# Patient Record
Sex: FEMALE | Race: WHITE | HISPANIC OR LATINO | Employment: UNEMPLOYED | RURAL
[De-identification: names, ages, dates, MRNs, and addresses within clinical notes are randomized per-mention and may not be internally consistent; named-entity substitution may affect disease eponyms.]

---
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
## 2020-10-23 ENCOUNTER — OFFICE VISIT (OUTPATIENT)
Dept: FAMILY MEDICINE CLINIC | Facility: CLINIC | Age: 20
End: 2020-10-23

## 2020-10-23 VITALS
BODY MASS INDEX: 25.95 KG/M2 | SYSTOLIC BLOOD PRESSURE: 118 MMHG | WEIGHT: 141 LBS | HEIGHT: 62 IN | OXYGEN SATURATION: 98 % | DIASTOLIC BLOOD PRESSURE: 68 MMHG | TEMPERATURE: 98.2 F | RESPIRATION RATE: 16 BRPM | HEART RATE: 105 BPM

## 2020-10-23 DIAGNOSIS — Z00.00 PHYSICAL EXAM: Primary | ICD-10-CM

## 2020-10-23 PROCEDURE — PEPHY: Performed by: FAMILY MEDICINE

## 2020-10-23 RX ORDER — PRENATAL VIT/IRON FUM/FOLIC AC 27MG-0.8MG
1 TABLET ORAL DAILY
COMMUNITY
End: 2022-06-24

## 2020-10-23 NOTE — PROGRESS NOTES
Subjective   Hina Tucker is a 20 y.o. female.     Chief Complaint   Patient presents with   • Pre-employment       Pre-Employment Exam:  Overall has: moderate activities, good energy level, good appetite, sleeping well at night. Nutrition: inappropriate diet. The patient has no complaints at this time. Physical limitations: none.         The following portions of the patient's history were reviewed and updated as appropriate: allergies, current medications, past family history, past medical history, past social history, past surgical history and problem list.    Allergies:  Allergies   Allergen Reactions   • Amoxicillin Anaphylaxis   • Penicillin G Anaphylaxis       Social History:  Social History     Socioeconomic History   • Marital status: Single     Spouse name: Not on file   • Number of children: Not on file   • Years of education: Not on file   • Highest education level: Not on file   Tobacco Use   • Smoking status: Current Every Day Smoker     Packs/day: 0.50     Years: 4.00     Pack years: 2.00     Types: Cigarettes     Start date: 2016   • Smokeless tobacco: Never Used   Substance and Sexual Activity   • Alcohol use: No     Frequency: Never   • Drug use: No   • Sexual activity: Defer       Family History:  Family History   Problem Relation Age of Onset   • COPD Maternal Grandmother    • Stroke Maternal Grandfather        Past Medical History :  Patient Active Problem List   Diagnosis   • Anxiety and depression   • Attention deficit hyperactivity disorder (ADHD), predominantly inattentive type       Medication List:  Outpatient Encounter Medications as of 10/23/2020   Medication Sig Dispense Refill   • Prenatal Vit-Fe Fumarate-FA (prenatal vitamin 27-0.8) 27-0.8 MG tablet tablet Take 1 tablet by mouth Daily.     • [DISCONTINUED] GAVIOTA 0.25-35 MG-MCG per tablet Take 1 tablet by mouth Daily.  1     No facility-administered encounter medications on file as of 10/23/2020.        Past Surgical  "History:  History reviewed. No pertinent surgical history.    Review of Systems:  Review of Systems   Constitutional: Negative for appetite change, fatigue and fever.   HENT: Negative for congestion, ear pain, sinus pressure, sore throat and tinnitus.    Eyes: Negative for visual disturbance.   Respiratory: Negative for cough, shortness of breath and wheezing.    Cardiovascular: Negative for chest pain, palpitations and leg swelling.   Gastrointestinal: Negative for abdominal pain, constipation, diarrhea, nausea and vomiting.   Endocrine: Negative.  Negative for cold intolerance, heat intolerance, polydipsia and polyuria.   Genitourinary: Negative for dysuria, frequency and hematuria.        She is 24 weeks  pregnant   Musculoskeletal: Negative for arthralgias, joint swelling and myalgias.   Skin: Negative for rash and bruise.   Allergic/Immunologic: Negative for environmental allergies.   Neurological: Negative for dizziness, syncope, weakness and headache.   Hematological: Negative for adenopathy. Does not bruise/bleed easily.   Psychiatric/Behavioral: Negative for depressed mood.       I have reviewed and confirmed the accuracy of the HPI and ROS as documented by the MA/LPN/RN Rachell Tam MD    Vital Signs:  Visit Vitals  /68 (BP Location: Right arm, Patient Position: Sitting, Cuff Size: Adult)   Pulse 105   Temp 98.2 °F (36.8 °C) (Oral)   Resp 16   Ht 157.5 cm (62\")   Wt 64 kg (141 lb)   LMP 04/26/2020   SpO2 98% Comment: Room air   BMI 25.79 kg/m²       Physical Exam  Vitals signs reviewed.   Constitutional:       General: She is not in acute distress.     Appearance: She is well-developed.   HENT:      Right Ear: Tympanic membrane and external ear normal.      Left Ear: Tympanic membrane and external ear normal.   Eyes:      Conjunctiva/sclera: Conjunctivae normal.   Neck:      Musculoskeletal: Neck supple.      Thyroid: No thyromegaly.      Vascular: No JVD.   Cardiovascular:      Rate and Rhythm: " Normal rate and regular rhythm.      Heart sounds: Normal heart sounds. No murmur. No friction rub. No gallop.    Pulmonary:      Effort: Pulmonary effort is normal. No respiratory distress.      Breath sounds: Normal breath sounds. No wheezing or rales.   Abdominal:      General: Bowel sounds are normal. There is no distension.      Palpations: Abdomen is soft.      Tenderness: There is no abdominal tenderness.      Comments: Gravid 24 weeks   Lymphadenopathy:      Cervical: No cervical adenopathy.   Skin:     General: Skin is warm.      Findings: No erythema or rash.   Neurological:      Mental Status: She is alert and oriented to person, place, and time.      Coordination: Coordination normal.      Deep Tendon Reflexes: Reflexes normal.         Assessment and Plan:  Problem List Items Addressed This Visit     None      Visit Diagnoses     Physical exam    -  Primary    Pre employment. 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           An After Visit Summary and PPPS were given to the patient.       I wore protective equipment throughout this patient encounter to include mask and eye protection. Hand hygiene was performed before donning protective equipment and after removal when leaving the room.

## 2021-10-12 ENCOUNTER — OFFICE VISIT (OUTPATIENT)
Dept: FAMILY MEDICINE CLINIC | Facility: CLINIC | Age: 21
End: 2021-10-12

## 2021-10-12 VITALS
SYSTOLIC BLOOD PRESSURE: 104 MMHG | WEIGHT: 131 LBS | OXYGEN SATURATION: 99 % | HEART RATE: 112 BPM | DIASTOLIC BLOOD PRESSURE: 62 MMHG | HEIGHT: 62 IN | BODY MASS INDEX: 24.11 KG/M2 | TEMPERATURE: 97.8 F

## 2021-10-12 DIAGNOSIS — Z11.59 ENCOUNTER FOR HEPATITIS C SCREENING TEST FOR LOW RISK PATIENT: ICD-10-CM

## 2021-10-12 DIAGNOSIS — Z13.29 SCREENING FOR THYROID DISORDER: ICD-10-CM

## 2021-10-12 DIAGNOSIS — Z86.59 HISTORY OF ADHD: Primary | ICD-10-CM

## 2021-10-12 DIAGNOSIS — Z30.013 ENCOUNTER FOR INITIAL PRESCRIPTION OF INJECTABLE CONTRACEPTIVE: ICD-10-CM

## 2021-10-12 DIAGNOSIS — F41.9 ANXIETY: ICD-10-CM

## 2021-10-12 DIAGNOSIS — Z79.899 MEDICATION MANAGEMENT: ICD-10-CM

## 2021-10-12 DIAGNOSIS — Z13.220 SCREENING FOR LIPID DISORDERS: ICD-10-CM

## 2021-10-12 LAB
ALBUMIN SERPL-MCNC: 4.9 G/DL (ref 3.5–5.2)
ALBUMIN/GLOB SERPL: 2 G/DL
ALP SERPL-CCNC: 48 U/L (ref 39–117)
ALT SERPL W P-5'-P-CCNC: 11 U/L (ref 1–33)
ANION GAP SERPL CALCULATED.3IONS-SCNC: 8.6 MMOL/L (ref 5–15)
AST SERPL-CCNC: 16 U/L (ref 1–32)
B-HCG UR QL: NEGATIVE
BASOPHILS # BLD AUTO: 0.03 10*3/MM3 (ref 0–0.2)
BASOPHILS NFR BLD AUTO: 1.1 % (ref 0–1.5)
BILIRUB SERPL-MCNC: 0.4 MG/DL (ref 0–1.2)
BUN SERPL-MCNC: 9 MG/DL (ref 6–20)
BUN/CREAT SERPL: 14.8 (ref 7–25)
CALCIUM SPEC-SCNC: 9.6 MG/DL (ref 8.6–10.5)
CHLORIDE SERPL-SCNC: 105 MMOL/L (ref 98–107)
CHOLEST SERPL-MCNC: 137 MG/DL (ref 0–200)
CO2 SERPL-SCNC: 24.4 MMOL/L (ref 22–29)
CREAT SERPL-MCNC: 0.61 MG/DL (ref 0.57–1)
DEPRECATED RDW RBC AUTO: 45.2 FL (ref 37–54)
EOSINOPHIL # BLD AUTO: 0.06 10*3/MM3 (ref 0–0.4)
EOSINOPHIL NFR BLD AUTO: 2.3 % (ref 0.3–6.2)
ERYTHROCYTE [DISTWIDTH] IN BLOOD BY AUTOMATED COUNT: 15 % (ref 12.3–15.4)
EXPIRATION DATE: NORMAL
GFR SERPL CREATININE-BSD FRML MDRD: 124 ML/MIN/1.73
GLOBULIN UR ELPH-MCNC: 2.5 GM/DL
GLUCOSE SERPL-MCNC: 77 MG/DL (ref 65–99)
HCT VFR BLD AUTO: 34.4 % (ref 34–46.6)
HCV AB SER DONR QL: NORMAL
HDLC SERPL-MCNC: 44 MG/DL (ref 40–60)
HGB BLD-MCNC: 11 G/DL (ref 12–15.9)
IMM GRANULOCYTES # BLD AUTO: 0 10*3/MM3 (ref 0–0.05)
IMM GRANULOCYTES NFR BLD AUTO: 0 % (ref 0–0.5)
INTERNAL NEGATIVE CONTROL: NORMAL
INTERNAL POSITIVE CONTROL: NORMAL
LDLC SERPL CALC-MCNC: 81 MG/DL (ref 0–100)
LDLC/HDLC SERPL: 1.87 {RATIO}
LYMPHOCYTES # BLD AUTO: 0.91 10*3/MM3 (ref 0.7–3.1)
LYMPHOCYTES NFR BLD AUTO: 34.3 % (ref 19.6–45.3)
Lab: NORMAL
MCH RBC QN AUTO: 26.3 PG (ref 26.6–33)
MCHC RBC AUTO-ENTMCNC: 32 G/DL (ref 31.5–35.7)
MCV RBC AUTO: 82.1 FL (ref 79–97)
MONOCYTES # BLD AUTO: 0.24 10*3/MM3 (ref 0.1–0.9)
MONOCYTES NFR BLD AUTO: 9.1 % (ref 5–12)
NEUTROPHILS NFR BLD AUTO: 1.41 10*3/MM3 (ref 1.7–7)
NEUTROPHILS NFR BLD AUTO: 53.2 % (ref 42.7–76)
NRBC BLD AUTO-RTO: 0 /100 WBC (ref 0–0.2)
PLATELET # BLD AUTO: 267 10*3/MM3 (ref 140–450)
PMV BLD AUTO: 10.9 FL (ref 6–12)
POTASSIUM SERPL-SCNC: 3.7 MMOL/L (ref 3.5–5.2)
PROT SERPL-MCNC: 7.4 G/DL (ref 6–8.5)
RBC # BLD AUTO: 4.19 10*6/MM3 (ref 3.77–5.28)
SODIUM SERPL-SCNC: 138 MMOL/L (ref 136–145)
TRIGL SERPL-MCNC: 54 MG/DL (ref 0–150)
TSH SERPL DL<=0.05 MIU/L-ACNC: 0.77 UIU/ML (ref 0.27–4.2)
VLDLC SERPL-MCNC: 12 MG/DL (ref 5–40)
WBC # BLD AUTO: 2.65 10*3/MM3 (ref 3.4–10.8)

## 2021-10-12 PROCEDURE — 86803 HEPATITIS C AB TEST: CPT | Performed by: FAMILY MEDICINE

## 2021-10-12 PROCEDURE — 80061 LIPID PANEL: CPT | Performed by: FAMILY MEDICINE

## 2021-10-12 PROCEDURE — 80050 GENERAL HEALTH PANEL: CPT | Performed by: FAMILY MEDICINE

## 2021-10-12 PROCEDURE — 81025 URINE PREGNANCY TEST: CPT | Performed by: FAMILY MEDICINE

## 2021-10-12 PROCEDURE — 99214 OFFICE O/P EST MOD 30 MIN: CPT | Performed by: FAMILY MEDICINE

## 2021-10-12 RX ORDER — ESCITALOPRAM OXALATE 10 MG/1
10 TABLET ORAL EVERY MORNING
Qty: 30 TABLET | Refills: 0 | Status: SHIPPED | OUTPATIENT
Start: 2021-10-12 | End: 2021-11-05 | Stop reason: SDUPTHER

## 2021-10-12 RX ORDER — MEDROXYPROGESTERONE ACETATE 150 MG/ML
150 INJECTION, SUSPENSION INTRAMUSCULAR
Qty: 1 ML | Refills: 1 | Status: SHIPPED | OUTPATIENT
Start: 2021-10-12 | End: 2021-12-29 | Stop reason: SDUPTHER

## 2021-10-12 RX ORDER — HYDROXYZINE HYDROCHLORIDE 25 MG/1
25 TABLET, FILM COATED ORAL 3 TIMES DAILY PRN
Qty: 45 TABLET | Refills: 0 | Status: SHIPPED | OUTPATIENT
Start: 2021-10-12 | End: 2021-10-22

## 2021-10-12 NOTE — PROGRESS NOTES
"Chief Complaint    ADHD (history or and back in school)    Subjective      Hina Tucker presents to [facility]     History of Present Illness    1.) HISTORY OF ADHD : Patient reports a history of ADHD where she was once treated during her teenage years. She does not remember the name of the medication she was prescribed. She admits that she has not been taking any medication for her symptoms for several years. She presents requesting restarting her medication secondary to starting school, being a new mom, etc.    2.) CONTRACEPTION MANAGEMENT : Patient is requesting starting the Depo-Provera. No history of blood clots. She admits to using vapes for both nicotine and THC.    3.) MOOD COMPLAINT : Patient reports complaints of anxiety. Reports frequent anger outbursts. Reports oscillation of her moods where she intermittently feels encouraged to complete tasks and then sometimes 'lays around the house all day.' She reports being treated for mood disorder in the past, but does not remember the name of the medications. She denies depression. She reports that she does not like being around people and she is not interested in 'having friends.'     Objective      Vital Signs:     /62   Pulse 112   Temp 97.8 °F (36.6 °C)   Ht 157.5 cm (62\")   Wt 59.4 kg (131 lb)   SpO2 99%   BMI 23.96 kg/m²       Physical Exam  Vitals reviewed.   Constitutional:       General: She is not in acute distress.     Appearance: Normal appearance. She is well-developed.   HENT:      Head: Normocephalic and atraumatic.      Right Ear: Hearing and external ear normal.      Left Ear: Hearing and external ear normal.      Nose: Nose normal.   Eyes:      General: Lids are normal.         Right eye: No discharge.         Left eye: No discharge.      Conjunctiva/sclera: Conjunctivae normal.   Pulmonary:      Effort: Pulmonary effort is normal.   Abdominal:      General: There is no distension. "   Musculoskeletal:         General: No swelling.      Cervical back: Neck supple.   Skin:     Coloration: Skin is not jaundiced.      Findings: No erythema.   Neurological:      Mental Status: She is alert. Mental status is at baseline.   Psychiatric:         Mood and Affect: Mood and affect normal.         Thought Content: Thought content normal.     Assessment and Plan    Diagnoses and all orders for this visit:    1. History of ADHD (Primary)  -     TSH  -     Ambulatory Referral to Psychiatry    2. Encounter for initial prescription of injectable contraceptive  -     POCT pregnancy, urine    3. Anxiety    4. Screening for thyroid disorder    5. Encounter for hepatitis C screening test for low risk patient  -     Hepatitis C antibody    6. Medication management  -     CBC w AUTO Differential  -     Comprehensive metabolic panel    7. Screening for lipid disorders  -     Lipid panel    Other orders  -     escitalopram (Lexapro) 10 MG tablet; Take 1 tablet by mouth Every Morning.  Dispense: 30 tablet; Refill: 0  -     hydrOXYzine (ATARAX) 25 MG tablet; Take 1 tablet by mouth 3 (Three) Times a Day As Needed for Anxiety.  Dispense: 45 tablet; Refill: 0  -     medroxyPROGESTERone (Depo-Provera) 150 MG/ML injection; Inject 1 mL into the appropriate muscle as directed by prescriber Every 3 (Three) Months.  Dispense: 1 mL; Refill: 1  -     calcium-vitamin D 250-100 MG-UNIT per tablet; Take 1 tablet by mouth 2 (Two) Times a Day.  Dispense: 180 tablet; Refill: 3      Follow Up     Return in about 1 week (around 10/19/2021) for lab review/follow up new medications.     Patient was given instructions and counseling regarding her condition or for health maintenance advice. Please see specific information pulled into the AVS if appropriate.

## 2021-10-12 NOTE — PROGRESS NOTES
Venipuncture Blood Specimen Collection  Venipuncture performed in left arm  by Jen Escobedo with good hemostasis. Patient tolerated the procedure well without complications.   10/12/21   Jen Escobedo

## 2021-10-13 ENCOUNTER — CLINICAL SUPPORT (OUTPATIENT)
Dept: FAMILY MEDICINE CLINIC | Facility: CLINIC | Age: 21
End: 2021-10-13

## 2021-10-13 DIAGNOSIS — Z78.9 USES BIRTH CONTROL: Primary | ICD-10-CM

## 2021-10-13 PROCEDURE — 96372 THER/PROPH/DIAG INJ SC/IM: CPT | Performed by: FAMILY MEDICINE

## 2021-10-13 RX ORDER — MEDROXYPROGESTERONE ACETATE 150 MG/ML
150 INJECTION, SUSPENSION INTRAMUSCULAR ONCE
Status: COMPLETED | OUTPATIENT
Start: 2021-10-13 | End: 2021-10-13

## 2021-10-13 RX ADMIN — MEDROXYPROGESTERONE ACETATE 150 MG: 150 INJECTION, SUSPENSION INTRAMUSCULAR at 09:55

## 2021-10-22 ENCOUNTER — OFFICE VISIT (OUTPATIENT)
Dept: FAMILY MEDICINE CLINIC | Facility: CLINIC | Age: 21
End: 2021-10-22

## 2021-10-22 VITALS
OXYGEN SATURATION: 100 % | BODY MASS INDEX: 23.74 KG/M2 | HEART RATE: 74 BPM | HEIGHT: 62 IN | SYSTOLIC BLOOD PRESSURE: 118 MMHG | WEIGHT: 129 LBS | TEMPERATURE: 97.7 F | DIASTOLIC BLOOD PRESSURE: 62 MMHG

## 2021-10-22 DIAGNOSIS — F41.9 ANXIETY AND DEPRESSION: ICD-10-CM

## 2021-10-22 DIAGNOSIS — F90.0 ATTENTION DEFICIT HYPERACTIVITY DISORDER (ADHD), PREDOMINANTLY INATTENTIVE TYPE: Primary | ICD-10-CM

## 2021-10-22 DIAGNOSIS — F32.A ANXIETY AND DEPRESSION: ICD-10-CM

## 2021-10-22 PROCEDURE — 99214 OFFICE O/P EST MOD 30 MIN: CPT | Performed by: FAMILY MEDICINE

## 2021-10-22 RX ORDER — DEXTROAMPHETAMINE SACCHARATE, AMPHETAMINE ASPARTATE MONOHYDRATE, DEXTROAMPHETAMINE SULFATE AND AMPHETAMINE SULFATE 5; 5; 5; 5 MG/1; MG/1; MG/1; MG/1
20 CAPSULE, EXTENDED RELEASE ORAL EVERY MORNING
Qty: 30 CAPSULE | Refills: 0 | Status: SHIPPED | OUTPATIENT
Start: 2021-10-22 | End: 2021-11-22 | Stop reason: SDUPTHER

## 2021-10-22 RX ORDER — BUSPIRONE HYDROCHLORIDE 5 MG/1
5 TABLET ORAL 2 TIMES DAILY
Qty: 60 TABLET | Refills: 0 | Status: SHIPPED | OUTPATIENT
Start: 2021-10-22 | End: 2021-11-22

## 2021-10-22 NOTE — PROGRESS NOTES
"Chief Complaint    Follow-up (F/U on her medication )    Subjective      Hina Tucker presents to [facility]     History of Present Illness    1.) ANXIETY AND DEPRESSION : The patient presents for follow-up after her recent visit, where she complained of anxiety depression. During that visit the patient was placed on Lexapro, as well as hydroxyzine. She reports that she has noticed a difference in her mood after starting Lexapro. She reports discontinuing Atarax secondary to sedation. Patient reports that she would like to try different medication for anxiety.    2.) ADHD : History of ADHD. Patient reported during her most recent visit that she had been off her medication for some time. Recent pregnancy. She reported an interest in restarting her medication secondary to starting school and other stressors at home including being a new mother. She admits to contacting [facility], where she was advised that they do not diagnose ADHD. We are still awaiting her records regarding her diagnosis. The patient reports that she continues to experience significant inattentiveness.    Objective      Vital Signs:     /62 (BP Location: Left arm, Patient Position: Sitting, Cuff Size: Adult)   Pulse 74   Temp 97.7 °F (36.5 °C) (Temporal)   Ht 157.5 cm (62\")   Wt 58.5 kg (129 lb)   SpO2 100%   BMI 23.59 kg/m²       Physical Exam  Vitals reviewed.   Constitutional:       General: She is not in acute distress.     Appearance: Normal appearance. She is well-developed.   HENT:      Head: Normocephalic and atraumatic.      Right Ear: Hearing and external ear normal.      Left Ear: Hearing and external ear normal.      Nose: Nose normal.   Eyes:      General: Lids are normal.         Right eye: No discharge.         Left eye: No discharge.      Conjunctiva/sclera: Conjunctivae normal.   Pulmonary:      Effort: Pulmonary effort is normal.   Abdominal:      General: There is no distension. "   Musculoskeletal:         General: No swelling.      Cervical back: Neck supple.   Skin:     Coloration: Skin is not jaundiced.      Findings: No erythema.   Neurological:      Mental Status: She is alert. Mental status is at baseline.   Psychiatric:         Mood and Affect: Mood and affect normal.         Thought Content: Thought content normal.     Assessment and Plan    Diagnoses and all orders for this visit:    1. Attention deficit hyperactivity disorder (ADHD), predominantly inattentive type (Primary)  Comments:  1.)  Referred to [facility] for diagnosis. Shared decision to re-start Adderall x 30 day supply. Patient will attempt to obtain records from [place].  Orders:  -     amphetamine-dextroamphetamine XR (Adderall XR) 20 MG 24 hr capsule; Take 1 capsule by mouth Every Morning  Dispense: 30 capsule; Refill: 0    2. Anxiety and depression  Comments:  1.) Continue Lexapro at current dose. Atarax discontinued. Start BuSpar. Return in 4 weeks for re-evaluation.    Other orders  -     busPIRone (BUSPAR) 5 MG tablet; Take 1 tablet by mouth 2 (two) times a day.  Dispense: 60 tablet; Refill: 0    *CONTROLLED SUBSTANCE AGREEMENT SIGNED.*    Follow Up     Return in about 4 weeks (around 11/19/2021) for anxiety and depression/ADHD.     Patient was given instructions and counseling regarding her condition or for health maintenance advice. Please see specific information pulled into the AVS if appropriate.

## 2021-11-05 ENCOUNTER — TELEPHONE (OUTPATIENT)
Dept: FAMILY MEDICINE CLINIC | Facility: CLINIC | Age: 21
End: 2021-11-05

## 2021-11-05 RX ORDER — ESCITALOPRAM OXALATE 10 MG/1
10 TABLET ORAL EVERY MORNING
Qty: 30 TABLET | Refills: 0 | Status: CANCELLED | OUTPATIENT
Start: 2021-11-05

## 2021-11-05 RX ORDER — ESCITALOPRAM OXALATE 10 MG/1
10 TABLET ORAL EVERY MORNING
Qty: 90 TABLET | Refills: 1 | Status: SHIPPED | OUTPATIENT
Start: 2021-11-05 | End: 2021-11-23 | Stop reason: SDUPTHER

## 2021-11-05 NOTE — TELEPHONE ENCOUNTER
Caller: Hina Tucker    Relationship: Self        Requested Prescriptions:   Requested Prescriptions     Pending Prescriptions Disp Refills   • escitalopram (Lexapro) 10 MG tablet 30 tablet 0     Sig: Take 1 tablet by mouth Every Morning.        Pharmacy where request should be sent: [facility]   [phone]     Additional details provided by patient:PATIENT HAS A WEEK LEFT. PLEASE CALL AND ADVISE.     Best call back number: [phone]     Does the patient have less than a 3 day supply:  [x] Yes  [] No    Lee Pate Rep   11/05/21 13:04 EDT

## 2021-11-09 ENCOUNTER — TELEPHONE (OUTPATIENT)
Dept: FAMILY MEDICINE CLINIC | Facility: CLINIC | Age: 21
End: 2021-11-09

## 2021-11-09 NOTE — TELEPHONE ENCOUNTER
Caller: Hina Tucker A    Relationship to patient: Self    Best call back number: [phone]    Patient is needing: PATIENT CALLED IN AND SAID SHE WOULD LIKE TO SPEAK WITH DR. BAL ABOUT HER ADHD MEDICATION. PLEASE CALL PATIENT AND ADVISE.

## 2021-11-17 ENCOUNTER — TELEPHONE (OUTPATIENT)
Dept: FAMILY MEDICINE CLINIC | Facility: CLINIC | Age: 21
End: 2021-11-17

## 2021-11-17 NOTE — TELEPHONE ENCOUNTER
Caller: Hina Tucker    Relationship: Self    Best call back number: [phone]    What was the call regarding: PATIENT CALLED WANTING TO KNOW IF HER MEDICAL RECORDS FROM [facility] IN [facility] WHERE RECEIVED AT THE OFFICE YET.    Do you require a callback: YES PLEASE CALL BACK AND ADVISE

## 2021-11-22 ENCOUNTER — OFFICE VISIT (OUTPATIENT)
Dept: FAMILY MEDICINE CLINIC | Facility: CLINIC | Age: 21
End: 2021-11-22

## 2021-11-22 VITALS
HEIGHT: 62 IN | SYSTOLIC BLOOD PRESSURE: 102 MMHG | OXYGEN SATURATION: 96 % | DIASTOLIC BLOOD PRESSURE: 64 MMHG | BODY MASS INDEX: 21.53 KG/M2 | TEMPERATURE: 98.2 F | WEIGHT: 117 LBS | HEART RATE: 76 BPM

## 2021-11-22 DIAGNOSIS — F90.0 ATTENTION DEFICIT HYPERACTIVITY DISORDER (ADHD), PREDOMINANTLY INATTENTIVE TYPE: ICD-10-CM

## 2021-11-22 DIAGNOSIS — N93.9 ABNORMAL UTERINE BLEEDING: Primary | ICD-10-CM

## 2021-11-22 PROCEDURE — 99213 OFFICE O/P EST LOW 20 MIN: CPT | Performed by: FAMILY MEDICINE

## 2021-11-22 RX ORDER — BUSPIRONE HYDROCHLORIDE 5 MG/1
TABLET ORAL
Qty: 60 TABLET | Refills: 2 | Status: SHIPPED | OUTPATIENT
Start: 2021-11-22 | End: 2022-02-23

## 2021-11-22 RX ORDER — DEXTROAMPHETAMINE SACCHARATE, AMPHETAMINE ASPARTATE MONOHYDRATE, DEXTROAMPHETAMINE SULFATE AND AMPHETAMINE SULFATE 5; 5; 5; 5 MG/1; MG/1; MG/1; MG/1
20 CAPSULE, EXTENDED RELEASE ORAL EVERY MORNING
Qty: 30 CAPSULE | Refills: 0 | Status: SHIPPED | OUTPATIENT
Start: 2021-11-22 | End: 2021-12-28 | Stop reason: SDUPTHER

## 2021-11-22 NOTE — PROGRESS NOTES
"Chief Complaint    ADD (refill) and Menstrual Problem (long periods )    Subjective      Hina Tucker presents to [facility]     History of Present Illness    1.) ADHD : Patient presents for medication refill. During her most recent visit here in office, she was started on Adderall. Advised to obtain her most recent medical records regarding her diagnosis. Records have been uploaded to chart. The patient reports significant improvement of her symptoms after starting the medication. She admits that she has experienced significant symptomatic improvement of her attention concerns. She would like to remain on her current dose that she is taking the medication once a day.    2.) ABNORMAL UTERINE BLEEDING : Depo-Provera-most recent injection 10/13/2021. Reports 3 weeks of menses-like symptoms. She reports improvement although symptoms. Denies any pelvic or abdominal pain.    Objective      Vital Signs:     /64   Pulse 76   Temp 98.2 °F (36.8 °C)   Ht 157.5 cm (62\")   Wt 53.1 kg (117 lb)   SpO2 96%   BMI 21.40 kg/m²       Physical Exam  Vitals reviewed.   Constitutional:       General: She is not in acute distress.     Appearance: Normal appearance. She is well-developed.   HENT:      Head: Normocephalic and atraumatic.      Right Ear: Hearing and external ear normal.      Left Ear: Hearing and external ear normal.      Nose: Nose normal.   Eyes:      General: Lids are normal.         Right eye: No discharge.         Left eye: No discharge.      Conjunctiva/sclera: Conjunctivae normal.   Pulmonary:      Effort: Pulmonary effort is normal.   Abdominal:      General: There is no distension.   Musculoskeletal:         General: No swelling.      Cervical back: Neck supple.   Skin:     Coloration: Skin is not jaundiced.      Findings: No erythema.   Neurological:      Mental Status: She is alert. Mental status is at baseline.   Psychiatric:         Mood and Affect: Mood and " affect normal.         Thought Content: Thought content normal.     Assessment and Plan     Diagnoses and all orders for this visit:    1. Abnormal uterine bleeding (Primary)  Comments:  1.) Watchful waiting, as patient reports improvement. Contact office if sxs do not continue to improve.    2. Attention deficit hyperactivity disorder (ADHD), predominantly inattentive type  Comments:  1.)  Records uploaded to chart. Will continue Adderall at current dose. Will complete UDS during next visit here in office.   Orders:  -     amphetamine-dextroamphetamine XR (Adderall XR) 20 MG 24 hr capsule; Take 1 capsule by mouth Every Morning  Dispense: 30 capsule; Refill: 0    Follow Up     Return in about 3 months (around 2/22/2022).     Patient was given instructions and counseling regarding her condition or for health maintenance advice. Please see specific information pulled into the AVS if appropriate.

## 2021-11-23 DIAGNOSIS — F90.0 ATTENTION DEFICIT HYPERACTIVITY DISORDER (ADHD), PREDOMINANTLY INATTENTIVE TYPE: ICD-10-CM

## 2021-11-23 RX ORDER — ESCITALOPRAM OXALATE 10 MG/1
10 TABLET ORAL EVERY MORNING
Qty: 90 TABLET | Refills: 1 | Status: SHIPPED | OUTPATIENT
Start: 2021-11-23 | End: 2022-01-26 | Stop reason: SDUPTHER

## 2021-11-23 NOTE — TELEPHONE ENCOUNTER
Caller: Hina Tucker    Relationship: Self    Best call back number: [phone]    Requested Prescriptions:   Requested Prescriptions     Pending Prescriptions Disp Refills   • escitalopram (Lexapro) 10 MG tablet 90 tablet 1     Sig: Take 1 tablet by mouth Every Morning.   • calcium-vitamin D 250-100 MG-UNIT per tablet 180 tablet 3     Sig: Take 1 tablet by mouth 2 (Two) Times a Day.        Pharmacy where request should be sent: [facility] [phone] [facility] [phone] [facility]     Additional details provided by patient: THE PATIENT IS COMPLETELY OUT OF HER LEXAPRO AND SHE IS ALSO OUT OF CALCIUM    Does the patient have less than a 3 day supply:  [x] Yes  [] No    Lee Caputo Rep   11/23/21 10:31 EST

## 2021-11-29 ENCOUNTER — TELEPHONE (OUTPATIENT)
Dept: FAMILY MEDICINE CLINIC | Facility: CLINIC | Age: 21
End: 2021-11-29

## 2021-11-29 NOTE — TELEPHONE ENCOUNTER
Caller: Hina Tucker A    Relationship to patient: Self    Best call back number: [phone]    Patient is needing: PATIENT CALLED STATING SHE CAME IN 11/22/2021 TO SEE HER PCP ABOUT HER BEING ON THE DEPO SHOT AND SHE STATED SHE HAS BEEN ON HER PERIOD FOR ABOUT 3 WEEKS. SHE STATED HER PCP TOLD HER TO GO HOME AND SEE HOW LONG IT LAST AND IF ITS STILL GOING ON AFTER ANOTHER WEEK THAT SHE NEEDS TO CALL BACK AND LET HER PCP KNOW ABOUT EITHER GETTING AN ULTRASOUND OR A PROGESTERONE SHOT. THE PATIENT WOULD LIKE A CALL BACK TO SPEAK WITH CLINICAL ABOUT THIS PLEASE ADVISE THANK YOU.

## 2021-12-01 RX ORDER — MEDROXYPROGESTERONE ACETATE 10 MG/1
10 TABLET ORAL DAILY
Qty: 10 TABLET | Refills: 0 | Status: SHIPPED | OUTPATIENT
Start: 2021-12-01 | End: 2021-12-11

## 2021-12-01 NOTE — TELEPHONE ENCOUNTER
Sent her a progestin to take for 10 days to see if that will help. She can call to let us know if she is still bleeding after completing the course. Thank you!

## 2021-12-13 RX ORDER — MEDROXYPROGESTERONE ACETATE 10 MG/1
TABLET ORAL
Qty: 10 TABLET | Refills: 0 | OUTPATIENT
Start: 2021-12-13

## 2021-12-28 ENCOUNTER — TELEPHONE (OUTPATIENT)
Dept: FAMILY MEDICINE CLINIC | Facility: CLINIC | Age: 21
End: 2021-12-28

## 2021-12-28 DIAGNOSIS — F90.0 ATTENTION DEFICIT HYPERACTIVITY DISORDER (ADHD), PREDOMINANTLY INATTENTIVE TYPE: ICD-10-CM

## 2021-12-28 RX ORDER — DEXTROAMPHETAMINE SACCHARATE, AMPHETAMINE ASPARTATE MONOHYDRATE, DEXTROAMPHETAMINE SULFATE AND AMPHETAMINE SULFATE 5; 5; 5; 5 MG/1; MG/1; MG/1; MG/1
20 CAPSULE, EXTENDED RELEASE ORAL EVERY MORNING
Qty: 30 CAPSULE | Refills: 0 | Status: SHIPPED | OUTPATIENT
Start: 2021-12-28 | End: 2022-01-26 | Stop reason: SDUPTHER

## 2021-12-28 NOTE — TELEPHONE ENCOUNTER
Caller: Hina Tucker    Relationship: Self    Best call back number: [phone]    Requested Prescriptions:  amphetamine-dextroamphetamine XR (Adderall XR) 20 MG 24 hr capsule     Pharmacy where request should be sent:     [facility], [place] - [address] [place] [address] [place] - [phone]  - [phone] FX  [phone]    Additional details provided by patient: PATIENT IS CURRENTLY OUT OF MEDICATION. SHE IS NEEDING A 60 DAY SUPPLY SENT TO PHARMACY.     Does the patient have less than a 3 day supply:  [x] Yes  [] No    Lee Montilla Rep   12/28/21 10:51 EST

## 2021-12-29 RX ORDER — MEDROXYPROGESTERONE ACETATE 150 MG/ML
150 INJECTION, SUSPENSION INTRAMUSCULAR
Qty: 1 ML | Refills: 1 | Status: SHIPPED | OUTPATIENT
Start: 2021-12-29 | End: 2022-01-26 | Stop reason: SDUPTHER

## 2021-12-29 NOTE — TELEPHONE ENCOUNTER
Caller: Hina Tucker    Relationship: Self    Best call back number: [phone]     Who are you requesting to speak with (clinical staff, provider,  specific staff member): MEDICAL STAFF    What was the call regarding: PATIENT WOULD LIKE TO KNOW WHEN THE LAST TIME SHE HAD HER DEPO SHOT AND WHEN IT IS TIME TO GET IT AGAIN. PLEASE CALL PATIENT TO ADVISE.

## 2022-01-26 ENCOUNTER — TELEPHONE (OUTPATIENT)
Dept: FAMILY MEDICINE CLINIC | Facility: CLINIC | Age: 22
End: 2022-01-26

## 2022-01-26 DIAGNOSIS — F90.0 ATTENTION DEFICIT HYPERACTIVITY DISORDER (ADHD), PREDOMINANTLY INATTENTIVE TYPE: ICD-10-CM

## 2022-01-26 RX ORDER — DEXTROAMPHETAMINE SACCHARATE, AMPHETAMINE ASPARTATE MONOHYDRATE, DEXTROAMPHETAMINE SULFATE AND AMPHETAMINE SULFATE 5; 5; 5; 5 MG/1; MG/1; MG/1; MG/1
20 CAPSULE, EXTENDED RELEASE ORAL EVERY MORNING
Qty: 30 CAPSULE | Refills: 0 | Status: SHIPPED | OUTPATIENT
Start: 2022-01-26 | End: 2022-02-22

## 2022-01-26 RX ORDER — MEDROXYPROGESTERONE ACETATE 150 MG/ML
150 INJECTION, SUSPENSION INTRAMUSCULAR
Qty: 1 ML | Refills: 1 | Status: SHIPPED | OUTPATIENT
Start: 2022-01-26 | End: 2022-02-07 | Stop reason: SDUPTHER

## 2022-01-26 RX ORDER — ESCITALOPRAM OXALATE 10 MG/1
10 TABLET ORAL EVERY MORNING
Qty: 90 TABLET | Refills: 1 | Status: SHIPPED | OUTPATIENT
Start: 2022-01-26 | End: 2022-09-13 | Stop reason: SDUPTHER

## 2022-01-26 NOTE — TELEPHONE ENCOUNTER
Caller: Hina Tucker    Relationship: Self    Best call back number:[phone]    Requested Prescriptions:   Requested Prescriptions     Pending Prescriptions Disp Refills   • amphetamine-dextroamphetamine XR (Adderall XR) 20 MG 24 hr capsule 30 capsule 0     Sig: Take 1 capsule by mouth Every Morning   • escitalopram (Lexapro) 10 MG tablet 90 tablet 1     Sig: Take 1 tablet by mouth Every Morning.   • medroxyPROGESTERone (Depo-Provera) 150 MG/ML injection 1 mL 1     Sig: Inject 1 mL into the appropriate muscle as directed by prescriber Every 3 (Three) Months.        Pharmacy where request should be sent: [facility] [address] [phone] [facility] [phone] FX     Additional details provided by patient:PATIENT IS OUT OF MEDICATION. SHE WOULD LIKE TO KNOW IF SHE NEEDS A VISIT FOR UPPING THE DOSAGE ON THE ADDERALL. PLEASE CALL AND ADVISE PATIENT ABOUT THESE MEDICATIONS TO MAKE SURE EVERYTHING IS CORRECT.    Does the patient have less than a 3 day supply:  [x] Yes  [] No    Lee Jefferson Rep   01/26/22 10:20 EST

## 2022-02-07 ENCOUNTER — CLINICAL SUPPORT (OUTPATIENT)
Dept: FAMILY MEDICINE CLINIC | Facility: CLINIC | Age: 22
End: 2022-02-07

## 2022-02-07 VITALS — HEART RATE: 77 BPM | OXYGEN SATURATION: 97 % | TEMPERATURE: 98 F

## 2022-02-07 DIAGNOSIS — Z30.9 ENCOUNTER FOR CONTRACEPTIVE MANAGEMENT, UNSPECIFIED TYPE: Primary | ICD-10-CM

## 2022-02-07 PROCEDURE — 96372 THER/PROPH/DIAG INJ SC/IM: CPT | Performed by: FAMILY MEDICINE

## 2022-02-07 RX ORDER — MEDROXYPROGESTERONE ACETATE 150 MG/ML
150 INJECTION, SUSPENSION INTRAMUSCULAR ONCE
Status: DISCONTINUED | OUTPATIENT
Start: 2022-02-07 | End: 2022-04-26

## 2022-02-22 ENCOUNTER — OFFICE VISIT (OUTPATIENT)
Dept: FAMILY MEDICINE CLINIC | Facility: CLINIC | Age: 22
End: 2022-02-22

## 2022-02-22 VITALS
HEART RATE: 134 BPM | HEIGHT: 62 IN | WEIGHT: 107 LBS | DIASTOLIC BLOOD PRESSURE: 64 MMHG | BODY MASS INDEX: 19.69 KG/M2 | SYSTOLIC BLOOD PRESSURE: 106 MMHG | OXYGEN SATURATION: 98 % | TEMPERATURE: 98 F

## 2022-02-22 DIAGNOSIS — F90.0 ATTENTION DEFICIT HYPERACTIVITY DISORDER (ADHD), PREDOMINANTLY INATTENTIVE TYPE: ICD-10-CM

## 2022-02-22 PROCEDURE — 99213 OFFICE O/P EST LOW 20 MIN: CPT | Performed by: FAMILY MEDICINE

## 2022-02-22 RX ORDER — DEXTROAMPHETAMINE SACCHARATE, AMPHETAMINE ASPARTATE, DEXTROAMPHETAMINE SULFATE AND AMPHETAMINE SULFATE 5; 5; 5; 5 MG/1; MG/1; MG/1; MG/1
20 TABLET ORAL 2 TIMES DAILY
Qty: 60 TABLET | Refills: 0 | Status: SHIPPED | OUTPATIENT
Start: 2022-02-22 | End: 2022-03-23 | Stop reason: SDUPTHER

## 2022-02-22 NOTE — PROGRESS NOTES
"Chief Complaint    ADHD    Subjective      Hina Tucker presents to [facility]    History of Present Illness    1.) ADHD : Patient presents for her follow up. She currently is prescribed 20 mg of Adderall XR. She notes that she feels as if the medication is is no longer providing her with focus. She reports significant improvement in her focus during when she first restarted her medication. She reports that she is experiencing side effects of the medication like decreased appetite but no focus. 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 She also reports being very fidgety and notes that she has been picking at her skin.     Objective     Vital Signs:     /64   Pulse (!) 134   Temp 98 °F (36.7 °C)   Ht 157.5 cm (62\")   Wt 48.5 kg (107 lb)   SpO2 98%   BMI 19.57 kg/m²       Physical Exam  Vitals reviewed.   Constitutional:       General: She is not in acute distress.     Appearance: Normal appearance. She is well-developed.   HENT:      Head: Normocephalic and atraumatic.      Right Ear: Hearing and external ear normal.      Left Ear: Hearing and external ear normal.      Nose: Nose normal.   Eyes:      General: Lids are normal.         Right eye: No discharge.         Left eye: No discharge.      Conjunctiva/sclera: Conjunctivae normal.   Pulmonary:      Effort: Pulmonary effort is normal.   Abdominal:      General: There is no distension.   Musculoskeletal:         General: No swelling.      Cervical back: Neck supple.   Skin:     Coloration: Skin is not jaundiced.      Findings: No erythema.   Neurological:      Mental Status: She is alert. Mental status is at baseline.   Psychiatric:         Mood and Affect: Mood and affect normal.         Thought Content: Thought content normal.     Assessment and Plan     Diagnoses and all orders for this visit:    1. Attention deficit hyperactivity disorder (ADHD), predominantly inattentive type  -     " amphetamine-dextroamphetamine (Adderall) 20 MG tablet; Take 1 tablet by mouth 2 (Two) Times a Day for 30 days.  Dispense: 60 tablet; Refill: 0    · Trial of BID dosing of non-XR formulation as noted. Patient will contact physician in a month regarding her sxs and will discuss dosing moving forward. Regarding being fidgety, shared decision to utilize activities that allow her to use her hands (ex. knitting).     Follow Up     Return in about 3 months (around 5/22/2022).    Patient was given instructions and counseling regarding her condition or for health maintenance advice. Please see specific information pulled into the AVS if appropriate.

## 2022-02-23 RX ORDER — BUSPIRONE HYDROCHLORIDE 5 MG/1
TABLET ORAL
Qty: 60 TABLET | Refills: 2 | Status: SHIPPED | OUTPATIENT
Start: 2022-02-23 | End: 2022-04-26

## 2022-03-23 DIAGNOSIS — F90.0 ATTENTION DEFICIT HYPERACTIVITY DISORDER (ADHD), PREDOMINANTLY INATTENTIVE TYPE: ICD-10-CM

## 2022-03-23 RX ORDER — DEXTROAMPHETAMINE SACCHARATE, AMPHETAMINE ASPARTATE, DEXTROAMPHETAMINE SULFATE AND AMPHETAMINE SULFATE 5; 5; 5; 5 MG/1; MG/1; MG/1; MG/1
20 TABLET ORAL 2 TIMES DAILY
Qty: 60 TABLET | Refills: 0 | Status: SHIPPED | OUTPATIENT
Start: 2022-03-23 | End: 2022-04-21 | Stop reason: SDUPTHER

## 2022-03-23 NOTE — TELEPHONE ENCOUNTER
Caller: Hina Tucker    Relationship: Self    Best call back number: [phone]    Requested Prescriptions:   Requested Prescriptions     Pending Prescriptions Disp Refills   • amphetamine-dextroamphetamine (Adderall) 20 MG tablet 60 tablet 0     Sig: Take 1 tablet by mouth 2 (Two) Times a Day for 30 days.        Pharmacy where request should be sent: [facility] [address] [phone] [facility] [phone] FX     Additional details provided by patient: Patient has one day supply left on hand     Does the patient have less than a 3 day supply:  [x] Yes  [] No    Lee Barroso Rep   03/23/22 10:22 EDT

## 2022-04-12 ENCOUNTER — TELEPHONE (OUTPATIENT)
Dept: FAMILY MEDICINE CLINIC | Facility: CLINIC | Age: 22
End: 2022-04-12

## 2022-04-12 ENCOUNTER — DOCUMENTATION (OUTPATIENT)
Dept: FAMILY MEDICINE CLINIC | Facility: CLINIC | Age: 22
End: 2022-04-12

## 2022-04-12 NOTE — TELEPHONE ENCOUNTER
Caller: Hina Tucker    Relationship: Self    Best call back number: [phone]     What is the best time to reach you: ANY     Who are you requesting to speak with RUTH BAL     What was the call regarding: PATIENT IS REQUESTING TO SPEAK WITH PROVIDER  MALLY. PATIENT IS REQUESTING PAIN MEDICATION REGARDING HER TOOTH PAIN AND ALSO ANTIBIOTICS.     PATIENT INSURANCE IS LONGER ACCEPTED AT [facility] AND PATIENT IS TRYING TO FIND A NEW DENTIST RIGHT NOW.       Do you require a callback: YES

## 2022-04-12 NOTE — TELEPHONE ENCOUNTER
Pt called on call phone on 4/11/2022 evening- pt said that she had severe tooth pain and possible infection.  I told pt to immediately go and get seen by a provider either at [facility] or the ER because tooth infections can spread fast into neck and brain and can cause SOA and/or death from swelling in neck and infection in brain.  Pt said that she understood the seriousness of the problem and would immediately go to get seen by a provider on the night of 4/11/2022.  I told pt to call our office on 4/12/2022 and let us know how she was doing and what the provider said and did for her on the night of 4/11/2022.

## 2022-04-21 ENCOUNTER — TELEPHONE (OUTPATIENT)
Dept: FAMILY MEDICINE CLINIC | Facility: CLINIC | Age: 22
End: 2022-04-21

## 2022-04-21 DIAGNOSIS — F90.0 ATTENTION DEFICIT HYPERACTIVITY DISORDER (ADHD), PREDOMINANTLY INATTENTIVE TYPE: ICD-10-CM

## 2022-04-21 RX ORDER — DEXTROAMPHETAMINE SACCHARATE, AMPHETAMINE ASPARTATE, DEXTROAMPHETAMINE SULFATE AND AMPHETAMINE SULFATE 5; 5; 5; 5 MG/1; MG/1; MG/1; MG/1
20 TABLET ORAL 2 TIMES DAILY
Qty: 10 TABLET | Refills: 0 | Status: SHIPPED | OUTPATIENT
Start: 2022-04-21 | End: 2022-04-26

## 2022-04-26 ENCOUNTER — OFFICE VISIT (OUTPATIENT)
Dept: FAMILY MEDICINE CLINIC | Facility: CLINIC | Age: 22
End: 2022-04-26

## 2022-04-26 VITALS
DIASTOLIC BLOOD PRESSURE: 80 MMHG | OXYGEN SATURATION: 98 % | WEIGHT: 104 LBS | BODY MASS INDEX: 19.02 KG/M2 | SYSTOLIC BLOOD PRESSURE: 120 MMHG | HEART RATE: 142 BPM | TEMPERATURE: 97.8 F

## 2022-04-26 DIAGNOSIS — F90.0 ATTENTION DEFICIT HYPERACTIVITY DISORDER (ADHD), PREDOMINANTLY INATTENTIVE TYPE: Primary | Chronic | ICD-10-CM

## 2022-04-26 DIAGNOSIS — K08.89 PAIN, DENTAL: ICD-10-CM

## 2022-04-26 DIAGNOSIS — Z30.016 ENCOUNTER FOR INITIAL PRESCRIPTION OF TRANSDERMAL PATCH HORMONAL CONTRACEPTIVE DEVICE: ICD-10-CM

## 2022-04-26 PROCEDURE — 99214 OFFICE O/P EST MOD 30 MIN: CPT | Performed by: FAMILY MEDICINE

## 2022-04-26 RX ORDER — CLINDAMYCIN HYDROCHLORIDE 300 MG/1
300 CAPSULE ORAL 3 TIMES DAILY
Qty: 21 CAPSULE | Refills: 0 | Status: SHIPPED | OUTPATIENT
Start: 2022-04-26 | End: 2022-05-03

## 2022-04-26 RX ORDER — DEXTROAMPHETAMINE SACCHARATE, AMPHETAMINE ASPARTATE, DEXTROAMPHETAMINE SULFATE AND AMPHETAMINE SULFATE 7.5; 7.5; 7.5; 7.5 MG/1; MG/1; MG/1; MG/1
30 TABLET ORAL 2 TIMES DAILY
Qty: 60 TABLET | Refills: 0 | Status: SHIPPED | OUTPATIENT
Start: 2022-04-26 | End: 2022-08-12 | Stop reason: SDUPTHER

## 2022-04-26 NOTE — PROGRESS NOTES
Chief Complaint    ADHD (Refill meds ), Dental Pain (Tooth abscess ), and Contraception    Subjective      Hina Tucker presents to [facility]    History of Present Illness    1.) ADHD : Patient is prescribed Adderall, which she takes at 20 mg BID. She reports that she has been experiencing recent increased stressors. She notes a recent break from her fiance and a stressful 'situation with her roommate.' She notes that she feels as if she is not able to focus on her school work.     2.) CONTRACEPTION MANAGEMENT : Current menses. Patient is inquiring regarding the birth control patch. She has used the depo injection in the past, but stopped due to concern for bone loss.     3.) DENTAL PAIN : The patient presents with c/o dental pain of her molars. She notes that she does need to have to teeth removed. She reports contacting her dentis, who advised her that her insurance is no longer accepted by that office.     Objective     Vital Signs:     /80   Pulse (!) 142   Temp 97.8 °F (36.6 °C)   Wt 47.2 kg (104 lb)   SpO2 98%   BMI 19.02 kg/m²       Physical Exam  Vitals reviewed.   Constitutional:       General: She is not in acute distress.     Appearance: Normal appearance. She is well-developed.   HENT:      Head: Normocephalic and atraumatic.      Right Ear: Hearing and external ear normal.      Left Ear: Hearing and external ear normal.      Nose: Nose normal.   Eyes:      General: Lids are normal.         Right eye: No discharge.         Left eye: No discharge.      Conjunctiva/sclera: Conjunctivae normal.   Pulmonary:      Effort: Pulmonary effort is normal.   Abdominal:      General: There is no distension.   Musculoskeletal:         General: No swelling.      Cervical back: Neck supple.   Skin:     Coloration: Skin is not jaundiced.      Findings: No erythema.   Neurological:      Mental Status: She is alert. Mental status is at baseline.   Psychiatric:         Mood  and Affect: Mood and affect normal.         Thought Content: Thought content normal.     Assessment and Plan     Diagnoses and all orders for this visit:    1. Attention deficit hyperactivity disorder (ADHD), predominantly inattentive type (Primary)  Comments:  Dose of Aderall increased to 30 mg daily. Will re-assess during 3 month follow up visit.    2. Pain, dental  Comments:  Empiric Clindamycin as noted. Follow up with dental professional.    3. Encounter for initial prescription of transdermal patch hormonal contraceptive device  Comments:  Start BC patch. Pt is not currently sexually active.     Other orders  -     norelgestromin-ethinyl estradiol (ORTHO EVRA) 150-35 MCG/24HR; Place 1 patch on the skin as directed by provider 1 (One) Time Per Week for 90 days.  Dispense: 12 patch; Refill: 0  -     clindamycin (Cleocin) 300 MG capsule; Take 1 capsule by mouth 3 (Three) Times a Day for 7 days.  Dispense: 21 capsule; Refill: 0    Follow Up      Return in about 3 months (around 7/26/2022).    Patient was given instructions and counseling regarding her condition or for health maintenance advice. Please see specific information pulled into the AVS if appropriate.

## 2022-05-24 RX ORDER — BUSPIRONE HYDROCHLORIDE 5 MG/1
TABLET ORAL
Qty: 60 TABLET | Refills: 2 | Status: SHIPPED | OUTPATIENT
Start: 2022-05-24 | End: 2022-06-24

## 2022-06-24 ENCOUNTER — OFFICE VISIT (OUTPATIENT)
Dept: FAMILY MEDICINE CLINIC | Facility: CLINIC | Age: 22
End: 2022-06-24

## 2022-06-24 VITALS
TEMPERATURE: 98 F | HEART RATE: 154 BPM | WEIGHT: 102.6 LBS | BODY MASS INDEX: 18.88 KG/M2 | SYSTOLIC BLOOD PRESSURE: 116 MMHG | HEIGHT: 62 IN | DIASTOLIC BLOOD PRESSURE: 64 MMHG | OXYGEN SATURATION: 99 %

## 2022-06-24 DIAGNOSIS — F90.0 ATTENTION DEFICIT HYPERACTIVITY DISORDER (ADHD), PREDOMINANTLY INATTENTIVE TYPE: Primary | Chronic | ICD-10-CM

## 2022-06-24 DIAGNOSIS — R00.0 TACHYCARDIA: ICD-10-CM

## 2022-06-24 DIAGNOSIS — F32.A ANXIETY AND DEPRESSION: Chronic | ICD-10-CM

## 2022-06-24 DIAGNOSIS — F41.9 ANXIETY AND DEPRESSION: Chronic | ICD-10-CM

## 2022-06-24 PROCEDURE — 99214 OFFICE O/P EST MOD 30 MIN: CPT | Performed by: FAMILY MEDICINE

## 2022-06-24 RX ORDER — DEXTROAMPHETAMINE SACCHARATE, AMPHETAMINE ASPARTATE, DEXTROAMPHETAMINE SULFATE AND AMPHETAMINE SULFATE 7.5; 7.5; 7.5; 7.5 MG/1; MG/1; MG/1; MG/1
30 TABLET ORAL DAILY
Qty: 30 TABLET | Refills: 0 | Status: SHIPPED | OUTPATIENT
Start: 2022-06-24 | End: 2022-07-01 | Stop reason: SDUPTHER

## 2022-06-24 RX ORDER — DEXTROAMPHETAMINE SACCHARATE, AMPHETAMINE ASPARTATE, DEXTROAMPHETAMINE SULFATE AND AMPHETAMINE SULFATE 7.5; 7.5; 7.5; 7.5 MG/1; MG/1; MG/1; MG/1
TABLET ORAL
COMMUNITY
Start: 2022-05-27 | End: 2022-06-24 | Stop reason: SDUPTHER

## 2022-06-24 NOTE — PROGRESS NOTES
"Chief Complaint    ADHD (Follow up and med refill)    Subjective      Hina Tucker presents to [facility]    History of Present Illness    1.) ADHD : During most recent here in office, dose of Adderall increased to 30 mg daily. Patient presents today reporting that she feels as if her sxs are well-controlled on her current dose of Adderall and she is able to concentrate no her school work and other activities. However, she presents today with a heart rate of 154. Repeat testing in the room revealed a heart rate that eventually decreased to 98.     2.) ANXIETY : Patient admits to anxiety sxs. She also reports to a high level of sensitivity to ideas situations such as constructive criticism. She reports that she feels as if past experiences in life now affect her current experiences. She notes that she would like to start talk therapy.    Objective     Vital Signs:     /64 (BP Location: Left arm, Patient Position: Sitting, Cuff Size: Adult)   Pulse (!) 154   Temp 98 °F (36.7 °C) (Temporal)   Ht 157.5 cm (62\")   Wt 46.5 kg (102 lb 9.6 oz)   SpO2 99%   BMI 18.77 kg/m²       Physical Exam  Vitals reviewed.   Constitutional:       General: She is not in acute distress.     Appearance: Normal appearance. She is well-developed.   HENT:      Head: Normocephalic and atraumatic.      Right Ear: Hearing and external ear normal.      Left Ear: Hearing and external ear normal.      Nose: Nose normal.   Eyes:      General: Lids are normal.         Right eye: No discharge.         Left eye: No discharge.      Conjunctiva/sclera: Conjunctivae normal.   Pulmonary:      Effort: Pulmonary effort is normal.   Abdominal:      General: There is no distension.   Musculoskeletal:         General: No swelling.      Cervical back: Neck supple.   Skin:     Coloration: Skin is not jaundiced.      Findings: No erythema.   Neurological:      Mental Status: She is alert. Mental status is at " baseline.   Psychiatric:         Mood and Affect: Mood and affect normal.         Thought Content: Thought content normal.     Assessment and Plan     Diagnoses and all orders for this visit:    1. Attention deficit hyperactivity disorder (ADHD), predominantly inattentive type (Primary)  Comments:  Will remain on current dose of Adderall.   Orders:  -     amphetamine-dextroamphetamine (ADDERALL) 30 MG tablet; Take 1 tablet by mouth Daily.  Dispense: 30 tablet; Refill: 0    2. Anxiety and depression  Comments:  Patient will be starting talk therapy. She is not interested in addition of a medication at this time.    3. Tachycardia  Comments:  Advised to purchase a pulse oximeter. Monitor NH at home, document and return for review. If tachy at home, will discuss decreasing dose or referral to cards.    Follow Up     Return in about 3 months (around 9/24/2022).    Patient was given instructions and counseling regarding her condition or for health maintenance advice. Please see specific information pulled into the AVS if appropriate.

## 2022-06-30 ENCOUNTER — TELEPHONE (OUTPATIENT)
Dept: FAMILY MEDICINE CLINIC | Facility: CLINIC | Age: 22
End: 2022-06-30

## 2022-07-01 DIAGNOSIS — F90.0 ATTENTION DEFICIT HYPERACTIVITY DISORDER (ADHD), PREDOMINANTLY INATTENTIVE TYPE: Chronic | ICD-10-CM

## 2022-07-01 RX ORDER — DEXTROAMPHETAMINE SACCHARATE, AMPHETAMINE ASPARTATE, DEXTROAMPHETAMINE SULFATE AND AMPHETAMINE SULFATE 7.5; 7.5; 7.5; 7.5 MG/1; MG/1; MG/1; MG/1
30 TABLET ORAL 2 TIMES DAILY
Qty: 60 TABLET | Refills: 0
Start: 2022-07-01 | End: 2022-07-06 | Stop reason: SDUPTHER

## 2022-07-01 RX ORDER — DEXTROAMPHETAMINE SACCHARATE, AMPHETAMINE ASPARTATE, DEXTROAMPHETAMINE SULFATE AND AMPHETAMINE SULFATE 7.5; 7.5; 7.5; 7.5 MG/1; MG/1; MG/1; MG/1
30 TABLET ORAL 2 TIMES DAILY
Qty: 60 TABLET | Refills: 0
Start: 2022-07-01 | End: 2022-07-01

## 2022-07-01 NOTE — TELEPHONE ENCOUNTER
Ah. My error. Please let her know to finish what she has and we can try sending it as the corrected prescription, when she's out. Thank you.

## 2022-07-05 ENCOUNTER — TELEPHONE (OUTPATIENT)
Dept: FAMILY MEDICINE CLINIC | Facility: CLINIC | Age: 22
End: 2022-07-05

## 2022-07-05 NOTE — TELEPHONE ENCOUNTER
Caller: [facility] - [place], [place] - [address] [facility] - [phone]  - [phone]     Relationship: Pharmacy      What is the best time to reach you: ANYTIME    Who are you requesting to speak with (clinical staff, provider,  specific staff member): CLLINICAL    Do you know the name of the person who called: MARANDA    What was the call regarding:     MARANDA SAID THE MEDICATION FOR THE ADDERALL WAS FILLED FOR ONCE A DAY. SHE SAID THE PATIENT  CALLED AND SAID IT SHOULD BE FOR TWICE A DAY. SHE SAID PATIENT HAS ALREADY  THE MEDICATION AND REQUEST THE PHARMACY CALL TO GET CLARIFICATION   .    amphetamine-dextroamphetamine (ADDERALL) 30 MG tablet      Do you require a callback:  YES

## 2022-07-06 RX ORDER — DEXTROAMPHETAMINE SACCHARATE, AMPHETAMINE ASPARTATE, DEXTROAMPHETAMINE SULFATE AND AMPHETAMINE SULFATE 7.5; 7.5; 7.5; 7.5 MG/1; MG/1; MG/1; MG/1
30 TABLET ORAL 2 TIMES DAILY
Qty: 60 TABLET | Refills: 0
Start: 2022-07-06 | End: 2022-07-14 | Stop reason: SDUPTHER

## 2022-07-06 NOTE — TELEPHONE ENCOUNTER
Please call the pharmacy and let them know that the patient's medication was sent as once a day instead of twice a day. Can we ask them when we can send a new corrected prescription. She technically should have 2 weeks of medication at this time, since her last visit. So she should be ok until we figure out what the pharmacy would prefer. Thanks!

## 2022-07-12 NOTE — TELEPHONE ENCOUNTER
I  have called [facility], spoke w/the pharmacist and he states he has not received the July 7th script, I could not give a verbal because of the kind of medicine it is.  I spoke w/Akil and she has been talking to Dr Amador and Akil sent the script to her so she can send it to [facility] for us.  Now we are just waiting for Dr Amador to send it.    Patient has not called back and spoke w/Annie, so Annie let her know that we will call her as soon as we get conformation that the script is at [facility].

## 2022-07-12 NOTE — TELEPHONE ENCOUNTER
Caller: Hina Tucker    Relationship: Self    Best call back number: [phone]    What is the best time to reach you: ANYTIME    Who are you requesting to speak with (clinical staff, provider,  specific staff member): CLINICAL      What was the call regarding: THE PATIENT STATED SHE SPOKE WITH THE PHARMACY THIS MORNING AND THEY STILL DO NOT HAVE THE CORRECTED PRESCRIPTION. SHE NEEDS THIS SENT ASAP AND A CALL BACK TO CONFIRM  amphetamine-dextroamphetamine (ADDERALL) 30 MG tablet  30 mg, 2 Times Daily     [facility] [address] [place] [address], [place] - [address] [facility] - [phone]  - [phone]   [phone]    Do you require a callback: YES

## 2022-07-13 RX ORDER — DEXTROAMPHETAMINE SACCHARATE, AMPHETAMINE ASPARTATE, DEXTROAMPHETAMINE SULFATE AND AMPHETAMINE SULFATE 7.5; 7.5; 7.5; 7.5 MG/1; MG/1; MG/1; MG/1
30 TABLET ORAL 2 TIMES DAILY
Qty: 60 TABLET | Refills: 0
Start: 2022-07-13 | End: 2022-07-28

## 2022-07-14 DIAGNOSIS — F90.9 ATTENTION DEFICIT HYPERACTIVITY DISORDER (ADHD), UNSPECIFIED ADHD TYPE: ICD-10-CM

## 2022-07-14 DIAGNOSIS — F90.9 ATTENTION DEFICIT HYPERACTIVITY DISORDER (ADHD), UNSPECIFIED ADHD TYPE: Primary | ICD-10-CM

## 2022-07-14 RX ORDER — DEXTROAMPHETAMINE SACCHARATE, AMPHETAMINE ASPARTATE, DEXTROAMPHETAMINE SULFATE AND AMPHETAMINE SULFATE 7.5; 7.5; 7.5; 7.5 MG/1; MG/1; MG/1; MG/1
30 TABLET ORAL 2 TIMES DAILY
Qty: 60 TABLET | Refills: 0
Start: 2022-07-14 | End: 2022-07-14 | Stop reason: SDUPTHER

## 2022-07-14 RX ORDER — DEXTROAMPHETAMINE SACCHARATE, AMPHETAMINE ASPARTATE, DEXTROAMPHETAMINE SULFATE AND AMPHETAMINE SULFATE 7.5; 7.5; 7.5; 7.5 MG/1; MG/1; MG/1; MG/1
30 TABLET ORAL 2 TIMES DAILY
Qty: 60 TABLET | Refills: 0 | Status: SHIPPED | OUTPATIENT
Start: 2022-07-14 | End: 2022-07-29

## 2022-08-02 NOTE — TELEPHONE ENCOUNTER
Caller: Hina Tucker    Relationship: Self    Best call back number:[phone]    Requested Prescriptions:   Requested Prescriptions     Pending Prescriptions Disp Refills   • norelgestromin-ethinyl estradiol (ORTHO EVRA) 150-35 MCG/24HR 12 patch 0     Sig: Place 1 patch on the skin as directed by provider 1 (One) Time Per Week for 90 days.        Pharmacy where request should be sent: [facility] [address] [phone] [facility] [phone] FX     Additional details provided by patient:     Does the patient have less than a 3 day supply:  [x] Yes  [] No    Lee Gabriel Rep   08/02/22 12:22 EDT

## 2022-08-12 ENCOUNTER — TELEPHONE (OUTPATIENT)
Dept: FAMILY MEDICINE CLINIC | Facility: CLINIC | Age: 22
End: 2022-08-12

## 2022-08-12 DIAGNOSIS — F90.0 ATTENTION DEFICIT HYPERACTIVITY DISORDER (ADHD), PREDOMINANTLY INATTENTIVE TYPE: Chronic | ICD-10-CM

## 2022-08-12 RX ORDER — DEXTROAMPHETAMINE SACCHARATE, AMPHETAMINE ASPARTATE, DEXTROAMPHETAMINE SULFATE AND AMPHETAMINE SULFATE 7.5; 7.5; 7.5; 7.5 MG/1; MG/1; MG/1; MG/1
30 TABLET ORAL 2 TIMES DAILY
Qty: 60 TABLET | Refills: 0 | Status: SHIPPED | OUTPATIENT
Start: 2022-08-12 | End: 2022-09-11

## 2022-08-12 NOTE — TELEPHONE ENCOUNTER
Caller: Hina Tucker    Relationship: Self    Best call back number: [phone]    Requested Prescriptions:         Amphetamine-Dextroamphetamine 30 MG 30 mg Oral 2 Times Daily        Pharmacy where request should be sent[address] [facility] [address] - [phone]  - [phone]      Additional details provided by patient: 1 DAY LEFT OF MEDICATION     Does the patient have less than a 3 day supply:  [x] Yes  [] No    Lee Conte Rep   08/12/22 11:54 EDT

## 2022-08-15 ENCOUNTER — OFFICE VISIT (OUTPATIENT)
Dept: FAMILY MEDICINE CLINIC | Facility: CLINIC | Age: 22
End: 2022-08-15

## 2022-08-15 VITALS — OXYGEN SATURATION: 100 % | HEART RATE: 80 BPM | TEMPERATURE: 98.1 F

## 2022-08-15 DIAGNOSIS — H10.9 CONJUNCTIVITIS OF RIGHT EYE, UNSPECIFIED CONJUNCTIVITIS TYPE: Primary | ICD-10-CM

## 2022-08-15 DIAGNOSIS — J02.9 PHARYNGITIS, UNSPECIFIED ETIOLOGY: ICD-10-CM

## 2022-08-15 PROCEDURE — 99213 OFFICE O/P EST LOW 20 MIN: CPT | Performed by: FAMILY MEDICINE

## 2022-08-15 PROCEDURE — U0004 COV-19 TEST NON-CDC HGH THRU: HCPCS | Performed by: FAMILY MEDICINE

## 2022-08-15 RX ORDER — ERYTHROMYCIN 5 MG/G
OINTMENT OPHTHALMIC 3 TIMES DAILY
Qty: 3.5 G | Refills: 1 | Status: SHIPPED | OUTPATIENT
Start: 2022-08-15 | End: 2022-08-22

## 2022-08-15 RX ORDER — AZITHROMYCIN 250 MG/1
TABLET, FILM COATED ORAL
Qty: 6 TABLET | Refills: 0 | Status: SHIPPED | OUTPATIENT
Start: 2022-08-15 | End: 2022-09-13

## 2022-08-15 RX ORDER — DEXAMETHASONE 6 MG/1
6 TABLET ORAL
Qty: 5 TABLET | Refills: 0 | Status: SHIPPED | OUTPATIENT
Start: 2022-08-15 | End: 2022-08-20

## 2022-08-15 NOTE — PROGRESS NOTES
Chief Complaint  Cough, Sore Throat, and URI (Cough, sore throat and fever x four days )    Subjective          Hina Tucker presents to [facility]  URI   This is a new problem. Episode onset: 4 days. The problem has been gradually worsening. There has been no fever. Associated symptoms include congestion, coughing, headaches and a sore throat. Pertinent negatives include no abdominal pain, chest pain, diarrhea, dysuria, ear pain, joint pain, joint swelling, nausea, neck pain, plugged ear sensation, rash, rhinorrhea, sinus pain, sneezing, swollen glands, vomiting or wheezing. Associated symptoms comments: Left conjunctivitis.. She has tried nothing for the symptoms.       Objective   Allergies   Allergen Reactions   • Amoxicillin Anaphylaxis   • Penicillin G Anaphylaxis     Immunization History   Administered Date(s) Administered   • COVID-19 (MODERNA) 1st, 2nd, 3rd Dose Only 11/30/2021   • DTP 2000, 2000, 05/14/2001, 11/14/2002, 07/15/2004   • DTaP 2000, 2000, 05/14/2001, 11/14/2002, 07/15/2004   • HPV Quadrivalent 09/22/2012, 12/08/2012, 10/02/2013   • Hep A, 2 Dose 11/06/2015, 10/06/2016   • Hep B, Adolescent or Pediatric 2000, 2000, 05/14/2001   • Hep B, Unspecified 2000, 2000, 05/14/2001   • HiB 2000, 2000, 05/14/2001, 11/14/2002   • Hib (PRP-OMP) 2000, 2000, 05/14/2001, 11/14/2002   • IPV 2000, 2000, 05/14/2001, 07/15/2004   • MMR 10/27/2001, 07/15/2004   • Meningococcal B,(Bexsero) 10/06/2016   • Meningococcal MCV4P (Menactra) 07/18/2011, 10/06/2016   • PPD Test 08/06/2019, 08/14/2019   • Polio, Unspecified 2000, 2000, 05/14/2001, 07/15/2004   • Tdap 07/18/2011, 12/10/2020   • Varicella 07/25/2001, 07/18/2011     Past Medical History:   Diagnosis Date   • Anxiety and depression       History reviewed. No pertinent surgical history.   Social History     Socioeconomic History   •  Marital status: Single   Tobacco Use   • Smoking status: Former Smoker     Packs/day: 0.50     Years: 4.00     Pack years: 2.00     Types: Cigarettes     Start date: 2016   • Smokeless tobacco: Never Used   Vaping Use   • Vaping Use: Every day   • Substances: Nicotine, THC, Flavoring   • Devices: Disposable   Substance and Sexual Activity   • Alcohol use: No   • Drug use: No   • Sexual activity: Defer        Current Outpatient Medications:   •  amphetamine-dextroamphetamine (Adderall) 30 MG tablet, Take 1 tablet by mouth 2 (Two) Times a Day for 30 days., Disp: 60 tablet, Rfl: 0  •  azithromycin (Zithromax Z-Kenny) 250 MG tablet, Take 2 tablets by mouth on day 1, then 1 tablet daily on days 2-5, Disp: 6 tablet, Rfl: 0  •  dexamethasone (DECADRON) 6 MG tablet, Take 1 tablet by mouth Daily With Breakfast for 5 days., Disp: 5 tablet, Rfl: 0  •  erythromycin (ROMYCIN) 5 MG/GM ophthalmic ointment, Administer  to the right eye 3 (Three) Times a Day for 7 days., Disp: 3.5 g, Rfl: 1  •  escitalopram (Lexapro) 10 MG tablet, Take 1 tablet by mouth Every Morning., Disp: 90 tablet, Rfl: 1  •  norelgestromin-ethinyl estradiol (ORTHO EVRA) 150-35 MCG/24HR, Place 1 patch on the skin as directed by provider 1 (One) Time Per Week for 90 days., Disp: 12 patch, Rfl: 0   Family History   Problem Relation Age of Onset   • COPD Maternal Grandmother    • Stroke Maternal Grandfather           Vital Signs:   Vitals:    08/15/22 1513   Pulse: 80   Temp: 98.1 °F (36.7 °C)   SpO2: 100%       Review of Systems   HENT: Positive for congestion and sore throat. Negative for ear pain, rhinorrhea, sinus pain and sneezing.    Respiratory: Positive for cough. Negative for wheezing.    Cardiovascular: Negative for chest pain.   Gastrointestinal: Negative for abdominal pain, diarrhea, nausea and vomiting.   Genitourinary: Negative for dysuria.   Musculoskeletal: Negative for joint pain and neck pain.   Skin: Negative for rash.   Neurological: Positive for  headaches.      Physical Exam  Vitals reviewed.   Constitutional:       Appearance: Normal appearance. She is well-developed.   HENT:      Head: Normocephalic and atraumatic.      Right Ear: Tympanic membrane, ear canal and external ear normal.      Left Ear: Tympanic membrane, ear canal and external ear normal.      Nose: Nose normal.      Mouth/Throat:      Mouth: Mucous membranes are moist.      Pharynx: Oropharynx is clear. Posterior oropharyngeal erythema present. No oropharyngeal exudate.   Eyes:      Extraocular Movements: Extraocular movements intact.      Pupils: Pupils are equal, round, and reactive to light.      Comments: Left conjunctiva normal, right conjunctiva with redness, greenish/yellow crusting.   Cardiovascular:      Rate and Rhythm: Normal rate and regular rhythm.      Pulses: Normal pulses.      Heart sounds: Normal heart sounds. No murmur heard.    No friction rub. No gallop.   Pulmonary:      Effort: Pulmonary effort is normal.      Breath sounds: Normal breath sounds. No wheezing or rhonchi.   Abdominal:      General: Abdomen is flat. Bowel sounds are normal. There is no distension.      Palpations: Abdomen is soft. There is no mass.      Tenderness: There is no abdominal tenderness. There is no guarding or rebound.      Hernia: No hernia is present.   Musculoskeletal:         General: Normal range of motion.   Skin:     General: Skin is warm and dry.      Capillary Refill: Capillary refill takes less than 2 seconds.   Neurological:      General: No focal deficit present.      Mental Status: She is alert and oriented to person, place, and time.      Cranial Nerves: No cranial nerve deficit.   Psychiatric:         Mood and Affect: Mood and affect normal.         Behavior: Behavior normal.         Thought Content: Thought content normal.         Judgment: Judgment normal.        Result Review :                 Assessment and Plan    Diagnoses and all orders for this visit:    1.  Conjunctivitis of right eye, unspecified conjunctivitis type (Primary)  -     erythromycin (ROMYCIN) 5 MG/GM ophthalmic ointment; Administer  to the right eye 3 (Three) Times a Day for 7 days.  Dispense: 3.5 g; Refill: 1    2. Pharyngitis, unspecified etiology  -     COVID-19,APTIMA PANTHER(CRYSTAL),[facility], NP/OP SWAB IN UTM/VTM/[facility]ALINE TRANSPORT MEDIA,24 HR TAT - Swab, Nasopharynx  -     azithromycin (Zithromax Z-Kenny) 250 MG tablet; Take 2 tablets by mouth on day 1, then 1 tablet daily on days 2-5  Dispense: 6 tablet; Refill: 0  -     dexamethasone (DECADRON) 6 MG tablet; Take 1 tablet by mouth Daily With Breakfast for 5 days.  Dispense: 5 tablet; Refill: 0            Follow Up   Return if symptoms worsen or fail to improve.  Patient was given instructions and counseling regarding her condition or for health maintenance advice. Please see specific information pulled into the AVS if appropriate.     Pt said that she has no chance of pregnancy.

## 2022-08-16 LAB — SARS-COV-2 RNA PNL SPEC NAA+PROBE: DETECTED

## 2022-09-06 RX ORDER — BUSPIRONE HYDROCHLORIDE 5 MG/1
TABLET ORAL
Qty: 60 TABLET | OUTPATIENT
Start: 2022-09-06

## 2022-09-13 ENCOUNTER — OFFICE VISIT (OUTPATIENT)
Dept: FAMILY MEDICINE CLINIC | Facility: CLINIC | Age: 22
End: 2022-09-13

## 2022-09-13 VITALS
WEIGHT: 106.6 LBS | HEART RATE: 132 BPM | HEIGHT: 62 IN | BODY MASS INDEX: 19.62 KG/M2 | SYSTOLIC BLOOD PRESSURE: 120 MMHG | OXYGEN SATURATION: 99 % | DIASTOLIC BLOOD PRESSURE: 66 MMHG

## 2022-09-13 DIAGNOSIS — R73.9 ELEVATED BLOOD SUGAR: ICD-10-CM

## 2022-09-13 DIAGNOSIS — F41.9 ANXIETY: Primary | ICD-10-CM

## 2022-09-13 LAB — HBA1C MFR BLD: 5.3 % (ref 4.8–5.6)

## 2022-09-13 PROCEDURE — 36415 COLL VENOUS BLD VENIPUNCTURE: CPT | Performed by: FAMILY MEDICINE

## 2022-09-13 PROCEDURE — 83036 HEMOGLOBIN GLYCOSYLATED A1C: CPT | Performed by: FAMILY MEDICINE

## 2022-09-13 PROCEDURE — 99214 OFFICE O/P EST MOD 30 MIN: CPT | Performed by: FAMILY MEDICINE

## 2022-09-13 RX ORDER — DEXTROAMPHETAMINE SACCHARATE, AMPHETAMINE ASPARTATE, DEXTROAMPHETAMINE SULFATE AND AMPHETAMINE SULFATE 7.5; 7.5; 7.5; 7.5 MG/1; MG/1; MG/1; MG/1
30 TABLET ORAL DAILY
COMMUNITY
End: 2022-09-14 | Stop reason: SDUPTHER

## 2022-09-13 RX ORDER — ESCITALOPRAM OXALATE 10 MG/1
10 TABLET ORAL EVERY MORNING
Qty: 90 TABLET | Refills: 1 | Status: SHIPPED | OUTPATIENT
Start: 2022-09-13 | End: 2022-11-30

## 2022-09-13 RX ORDER — BUSPIRONE HYDROCHLORIDE 5 MG/1
TABLET ORAL
COMMUNITY
Start: 2022-07-24 | End: 2022-09-13

## 2022-09-13 RX ORDER — PROPRANOLOL HYDROCHLORIDE 20 MG/1
20 TABLET ORAL 2 TIMES DAILY PRN
Qty: 30 TABLET | Refills: 0 | Status: SHIPPED | OUTPATIENT
Start: 2022-09-13 | End: 2022-10-12

## 2022-09-13 NOTE — PROGRESS NOTES
Venipuncture Blood Specimen Collection  Venipuncture performed in right arm by Jen Escobedo with good hemostasis. Patient tolerated the procedure well without complications.   09/13/22   Jen Escobedo

## 2022-09-13 NOTE — PROGRESS NOTES
"Chief Complaint    Anxiety (Follow-up) and Blood Sugar Problem (Would like labs to check blood sugar levels.)    Subjective      Hina Tucker presents to [facility]    History of Present Illness    1.) ANXIETY : Patient presents with complaints of anxiety.  She reports that she feels as if her anxiety symptoms are worsening.  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  She reports intermittent panic attacks.  She notes intermittent tachycardia.  Previoulsy prescribed Lexapro, which the patient has not started.  She notes a fairly chaotic life during the past 6 months with 5 moves during that time.  She is not complaining of any suicidal homicidal ideations.    2.) ELEVATED BLOOD GLUCOSE : Patient presents with complaints of recent elevation of her blood glucose.  She reports a family history significant for glucose issues.  However, she reports that her mother informed her of hypoglycemia.  She reports a random blood glucose measuring in the 180s.  She is requesting that we check her blood glucose.    Objective     Vital Signs:     /66 (BP Location: Left arm, Patient Position: Sitting, Cuff Size: Adult)   Pulse (!) 132   Ht 157.5 cm (62\")   Wt 48.4 kg (106 lb 9.6 oz)   SpO2 99%   BMI 19.50 kg/m²       Physical Exam  Vitals reviewed.   Constitutional:       General: She is not in acute distress.     Appearance: Normal appearance. She is well-developed.   HENT:      Head: Normocephalic and atraumatic.      Right Ear: Hearing and external ear normal.      Left Ear: Hearing and external ear normal.      Nose: Nose normal.   Eyes:      General: Lids are normal.         Right eye: No discharge.         Left eye: No discharge.      Conjunctiva/sclera: Conjunctivae normal.   Pulmonary:      Effort: Pulmonary effort is normal.   Abdominal:      General: There is no distension.   Musculoskeletal:         General: No swelling.      Cervical back: Neck supple.   Skin:    "  Coloration: Skin is not jaundiced.      Findings: No erythema.   Neurological:      Mental Status: She is alert. Mental status is at baseline.   Psychiatric:         Mood and Affect: Mood and affect normal.         Thought Content: Thought content normal.     Assessment and Plan     Diagnoses and all orders for this visit:    1. Anxiety (Primary)  Comments:  Discontinue Buspar. Start Propranol for PRN use. Start Lexapro as noted. Expect to follow up in 2 weeks.     2. Elevated blood sugar  Comments:  A1C as noted. Additional recs per review.  Orders:  -     Hemoglobin A1c    Other orders  -     escitalopram (Lexapro) 10 MG tablet; Take 1 tablet by mouth Every Morning.  Dispense: 90 tablet; Refill: 1  -     norelgestromin-ethinyl estradiol (ORTHO EVRA) 150-35 MCG/24HR; Place 1 patch on the skin as directed by provider 1 (One) Time Per Week for 90 days.  Dispense: 12 patch; Refill: 0  -     propranolol (INDERAL) 20 MG tablet; Take 1 tablet by mouth 2 (Two) Times a Day As Needed (anxiety sxs).  Dispense: 30 tablet; Refill: 0    Follow Up     Return in about 2 weeks (around 9/27/2022).    Patient was given instructions and counseling regarding her condition or for health maintenance advice. Please see specific information pulled into the AVS if appropriate.

## 2022-09-14 ENCOUNTER — TELEPHONE (OUTPATIENT)
Dept: FAMILY MEDICINE CLINIC | Facility: CLINIC | Age: 22
End: 2022-09-14

## 2022-09-14 RX ORDER — DEXTROAMPHETAMINE SACCHARATE, AMPHETAMINE ASPARTATE, DEXTROAMPHETAMINE SULFATE AND AMPHETAMINE SULFATE 7.5; 7.5; 7.5; 7.5 MG/1; MG/1; MG/1; MG/1
30 TABLET ORAL 2 TIMES DAILY
Qty: 60 TABLET | Refills: 0 | Status: SHIPPED | OUTPATIENT
Start: 2022-09-14 | End: 2022-10-18 | Stop reason: SDUPTHER

## 2022-09-14 NOTE — TELEPHONE ENCOUNTER
Said she takes 30mg of adderall twice daily and was needing that sent over to [facility] in [place]. She thought she was getting it sent yesterday. But I also did not see this on her medication list so told her we would send you a message.

## 2022-09-19 ENCOUNTER — TELEPHONE (OUTPATIENT)
Dept: FAMILY MEDICINE CLINIC | Facility: CLINIC | Age: 22
End: 2022-09-19

## 2022-09-19 NOTE — TELEPHONE ENCOUNTER
Caller: Hina Tucker    Relationship: Self    Best call back number: [phone]    Who are you requesting to speak with (clinical staff, provider,  specific staff member): CLINICAL    What was the call regarding: PATIENT STATES SHE WOULD LIKE TO KNOW IF CHILDHOOD HEP B VACCINE IS STILL GOOD AS AN ADULT. PATIENT STATES SHE BELIEVES SHE RECEIVED IT AROUND 1 YEARS OLD. PATIENT STATES SHE NEEDS THE HEP B VACCINE FOR A NEW JOB.    Do you require a callback: YES   CHIEF COMPLAINT: \"Tired\"     HISTORY OF PRESENT ILLNESS:  64 year-old woman who is here for follow up of her recently diagnosis non small cell lung adenocarcinoma. She is a never smoker and originally presented with left sided chest pain and shortness of breath.  She was initially at [facility] the end of October 2017 and underwent a thoracentesis which showed atypical cells.  CT scan showed large left pleural effusion left hilar and central mediastinal nodes.  She then presented to the emergency room at [facility] 10/23/17 and was found to have a large left pleural effusion again with a left upper lobe lesion as well as findings consistent with possible osseous metastatic disease at T9 and T11 and possible liver metastases.  In the hospital at [facility] she ultimately underwent a biopsy of the left lung mass as well as a thoracentesis.  Both of these revealed adenocarcinoma of lung origin.  She had an MRI the brain as well which showed a 2 mm enhancing left lateral cerebellar lesion concerning for metastases.      She was discharged to home with plans to follow up with the final pathology and plan of care.  However, she presented to the emergency room November 3, 2017 with pain complaints and shortness of breath again.  She had a repeat thoracentesis and a Pleurx catheter was placed.  She was discharged to home November 8, 2017.  The visiting nurses are seeing her but apparently they were told to drain the catheter \"only once a week.\"  That is clearly not what is written in the IR notes as the IR instructions say to drain every other day up to 1 L per week.  The patient and family told the nurses at home that she was short of breath and having left-sided chest pain but states that they'll be back again in one week to drain it again for the weekly drainage.     The patient completed radiation to T9 and T11 the weeks of Dec 11, 2017.  She had fairly significant esophagitis after that and was having trouble  eating. We did give her the triple elixir and that had slowly improved.     She started Tarceva on 12/2/17, as she does have the EGFR  sensitizing mutation. She was recently seen in our clinic for worsening rash while on the Tarceva. This was held x 1 week and she was started on doxycycline. The rash did resolve and patient was therefore then re-started on her Tarceva and instructed to continue the doxycycline as prescribed as well. Patient re-started her Tarceva on 1/24/18.    The patient is here accompanied by her daughter to review CT scans. The patient is conversational in english, however, her native language [place]. The patient tells me she is comfortable talking in english and if needed she tells me she prefers to use her daughter as an  and does not want a  to interpret for her.     She has had fairly significant improvement in her back pain in her shortness of breath since starting treatment but she reports that she still has some exertional dyspnea and occasional low back pain.  She has diarrhea that \"comes and goes\" and she uses Imodium with good relief.  Her skin rash has been markedly improved and she continues on the oral doxycycline but is not using any topical agents.    She was found to be hypothyroid with a TSH of 22 her Synthroid dose is at 50 µg.  She is due for repeat a TSH right around now.  We were hoping that correction of her hypothyroidism would help with her fatigue but in general she complains of being tired.  Her daughter said when she stands and is cooking she gets \"very warm\" and needs to sit down.  The daughter admits that the patient gets very down and frustrated.  She complains of generalized dizziness which seems to be at any time.    REVIEW OF SYSTEMS:  Nursing note reviewed and accepted. Also, please see HPI above.     PHYSICAL EXAMINATION:  Visit Vitals  /82 (BP Location: [facility], Patient Position: Standing, Cuff Size: Regular)   Pulse 75    Temp 97.9 °F (36.6 °C)   Resp 16   Ht 5' 2\" (1.575 m)   Wt 64.7 kg   SpO2 99%   BMI 26.09 kg/m²       Patient is alert, her affect and my opinion is somewhat flat although no different than usual.   HEENT: anicteric sclera   CARDIAC: RRR with occasional irregular beat  LUNGS: Diminished at the left base otherwise clear.  ABDOMEN: soft, active bowel sounds, non-tender   EXTREMITIES: no LE edema   SKIN: No appreciable rash on the face at this time    1 - No physically strenuous activity, but ambulatory and able to carry out light or sedentary work.    LABORATORY DATA:    Lab Results   Component Value Date/Time    WBC 5.8 02/26/2018 10:58 AM    RBC 4.61 02/26/2018 10:58 AM    HCT 41.2 02/26/2018 10:58 AM    HGB 14.2 02/26/2018 10:58 AM     02/26/2018 10:58 AM      Lab Results   Component Value Date/Time    SODIUM 142 02/26/2018 10:58 AM    POTASSIUM 3.5 02/26/2018 10:58 AM    CHLORIDE 108 (H) 02/26/2018 10:58 AM    GLUCOSE 110 (H) 02/26/2018 10:58 AM    CALCIUM 9.1 02/26/2018 10:58 AM    CO2 25 02/26/2018 10:58 AM    BUN 7 02/26/2018 10:58 AM    CREATININE 0.81 02/26/2018 10:58 AM      Lab Results   Component Value Date/Time    AST 18 02/26/2018 10:58 AM    GPT 27 02/26/2018 10:58 AM    ALKPT 107 02/26/2018 10:58 AM    BILIRUBIN 0.4 02/26/2018 10:58 AM      CT SCAN 2/2018  IMPRESSION:     1.  Overall positive response to therapy. The left upper lobe spiculated  pulmonary nodule has decreased in size, now measuring 1.2 cm in average  dimension, previously 1.6 cm. Soft tissue nodularity along the mediastinum  and pleura which was hypermetabolic on previous PET is no longer  identified. A 5 mm new subsolid groundglass nodule in the right middle lobe  may be inflammatory or infectious.  2.  Mediastinal and hilar adenopathy is no longer visualized.  3.  Previously lytic diffuse osseous metastatic disease now appears  sclerotic, compatible with treatment-related changes.  4.  Metastatic liver lesions have probably  also decreased in size, but this  is less well evaluated when compared to the prior PET/CT.  5.  Interval development of groundglass opacities and interlobular septal  thickening, which may relate to pulmonary edema, less likely infection.        IMPRESSION AND PLAN:  Metastatic non-small cell lung adenocarcinoma with an EGFR mutation-  She started Tarceva on 12/2/17, however, she presented to the clinic on 1/16/18 with an extensive acneiform rash and therefore was instructed to hold her Tarceva at that time and was also given a prescription for doxycyline. The rash has since resolved and patient was re-started back on the Tarceva as of 1/24/18.  We reviewed the CT scan which looks excellent in terms of disease response.  At this point the patient has no dose-limiting toxicity to the Tarceva and she will continue.      For the dizziness I will repeat a brain MRI.  She had a very nonspecific 2 mm brain nodule at the time of her initial staging and we will follow-up on this and make sure there is no other disease that has developed.    Fatigue-await the TSH as this may have some bearing on her ongoing fatigue.  However patient admits to being down somewhat frustrated and I suggested that we have her see Jaylyn again from palliative care who she had seen initially for some pain control issues.  I tried to be very positive in terms of the very good scan result but patient really does not seem to have any optimism about her outlook and response thus far.    Sumaya Felix MD

## 2022-09-20 NOTE — TELEPHONE ENCOUNTER
Tried calling patient back to let her know that it is on her immunization records here and that she can  her immunization record if she would like for her new job. Phone rang and then went strait to busy signal. Will try again.

## 2022-10-10 RX ORDER — PROPRANOLOL HYDROCHLORIDE 20 MG/1
TABLET ORAL
Qty: 30 TABLET | Refills: 0 | OUTPATIENT
Start: 2022-10-10

## 2022-10-12 ENCOUNTER — OFFICE VISIT (OUTPATIENT)
Dept: FAMILY MEDICINE CLINIC | Facility: CLINIC | Age: 22
End: 2022-10-12

## 2022-10-12 VITALS
WEIGHT: 107.2 LBS | BODY MASS INDEX: 19.73 KG/M2 | TEMPERATURE: 97.1 F | OXYGEN SATURATION: 99 % | HEART RATE: 124 BPM | SYSTOLIC BLOOD PRESSURE: 122 MMHG | DIASTOLIC BLOOD PRESSURE: 64 MMHG | HEIGHT: 62 IN

## 2022-10-12 DIAGNOSIS — F90.0 ATTENTION DEFICIT HYPERACTIVITY DISORDER (ADHD), PREDOMINANTLY INATTENTIVE TYPE: ICD-10-CM

## 2022-10-12 DIAGNOSIS — F41.9 ANXIETY: Primary | ICD-10-CM

## 2022-10-12 LAB
AMPHET+METHAMPHET UR QL: POSITIVE
BARBITURATES UR QL SCN: NEGATIVE
BENZODIAZ UR QL SCN: POSITIVE
CANNABINOIDS SERPL QL: POSITIVE
COCAINE UR QL: NEGATIVE
METHADONE UR QL SCN: NEGATIVE
OPIATES UR QL: NEGATIVE
OXYCODONE UR QL SCN: NEGATIVE

## 2022-10-12 PROCEDURE — 80307 DRUG TEST PRSMV CHEM ANLYZR: CPT | Performed by: FAMILY MEDICINE

## 2022-10-12 PROCEDURE — 99213 OFFICE O/P EST LOW 20 MIN: CPT | Performed by: FAMILY MEDICINE

## 2022-10-12 RX ORDER — PROPRANOLOL HYDROCHLORIDE 20 MG/1
40 TABLET ORAL 2 TIMES DAILY PRN
Qty: 30 TABLET | Refills: 0 | Status: SHIPPED
Start: 2022-10-12 | End: 2022-10-18 | Stop reason: SDUPTHER

## 2022-10-12 NOTE — PROGRESS NOTES
"Chief Complaint    Anxiety (Follow-up)    Subjective      Hina Tucker presents to [facility]    History of Present Illness    1.) ANXIETY : The patient presents for follow-up regarding anxiety.  She notes multiple stressors at home including being the primary caregiver for her ill mother-in-law.  During her most recent visit here in office, she was placed on propanolol plus Lexapro.  She reports that she feels as if propranolol has provided some symptomatic improvement.  However, she notes she continues to experience anxiety symptoms considering her home environment.     Objective     Vital Signs:     /64 (BP Location: Left arm, Patient Position: Sitting, Cuff Size: Adult)   Pulse (!) 124   Temp 97.1 °F (36.2 °C) (Temporal)   Ht 157.5 cm (62\")   Wt 48.6 kg (107 lb 3.2 oz)   SpO2 99%   BMI 19.61 kg/m²       Physical Exam  Vitals reviewed.   Constitutional:       General: She is not in acute distress.     Appearance: Normal appearance. She is well-developed.   HENT:      Head: Normocephalic and atraumatic.      Right Ear: Hearing and external ear normal.      Left Ear: Hearing and external ear normal.      Nose: Nose normal.   Eyes:      General: Lids are normal.         Right eye: No discharge.         Left eye: No discharge.      Conjunctiva/sclera: Conjunctivae normal.   Pulmonary:      Effort: Pulmonary effort is normal.   Abdominal:      General: There is no distension.   Musculoskeletal:         General: No swelling.      Cervical back: Neck supple.   Skin:     Coloration: Skin is not jaundiced.      Findings: No erythema.   Neurological:      Mental Status: She is alert. Mental status is at baseline.   Psychiatric:         Mood and Affect: Mood and affect normal.         Thought Content: Thought content normal.     Assessment and Plan     Diagnoses and all orders for this visit:    1. Anxiety (Primary)  Comments:  Dose of beta blocker increased as noted. " Continue escitalopram at current dose.     2. Attention deficit hyperactivity disorder (ADHD), predominantly inattentive type  -     Urine Drug Screen - Urine, Clean Catch    Other orders  -     propranolol (INDERAL) 20 MG tablet; Take 2 tablets by mouth 2 (Two) Times a Day As Needed (anxiety sxs).  Dispense: 30 tablet; Refill: 0    Follow Up     Return in about 1 month (around 11/12/2022).    Patient was given instructions and counseling regarding her condition or for health maintenance advice. Please see specific information pulled into the AVS if appropriate.

## 2022-10-17 ENCOUNTER — OFFICE VISIT (OUTPATIENT)
Dept: FAMILY MEDICINE CLINIC | Facility: CLINIC | Age: 22
End: 2022-10-17

## 2022-10-17 VITALS
HEIGHT: 62 IN | BODY MASS INDEX: 19.84 KG/M2 | HEART RATE: 110 BPM | TEMPERATURE: 97.8 F | OXYGEN SATURATION: 100 % | WEIGHT: 107.8 LBS

## 2022-10-17 DIAGNOSIS — R82.5 POSITIVE URINE DRUG SCREEN: Primary | ICD-10-CM

## 2022-10-17 PROCEDURE — 99213 OFFICE O/P EST LOW 20 MIN: CPT | Performed by: FAMILY MEDICINE

## 2022-10-17 NOTE — PROGRESS NOTES
"Chief Complaint    Anxiety (Follow-up and discuss lab results)    Subjective      Hina Tucker presents to [facility]    History of Present Illness    1.) ANXIETY : Pt presents to discuss recent UDS. (+) for benzodiazepine. Patient admits to taking 1/2 of her boyfriend's prescribed alprazolam. She notes multiple life stressors including being the primary caregiver for her boyfriend's mother, as well as being the mother of a 2 year old. She admits to significant worsening of her anxiety sxs.     Objective     Vital Signs:     Pulse 110   Temp 97.8 °F (36.6 °C) (Temporal)   Ht 157.5 cm (62\")   Wt 48.9 kg (107 lb 12.8 oz)   SpO2 100%   BMI 19.72 kg/m²       Physical Exam  Vitals reviewed.   Constitutional:       General: She is not in acute distress.     Appearance: Normal appearance. She is well-developed.   HENT:      Head: Normocephalic and atraumatic.      Right Ear: Hearing and external ear normal.      Left Ear: Hearing and external ear normal.      Nose: Nose normal.   Eyes:      General: Lids are normal.         Right eye: No discharge.         Left eye: No discharge.      Conjunctiva/sclera: Conjunctivae normal.   Pulmonary:      Effort: Pulmonary effort is normal.   Abdominal:      General: There is no distension.   Musculoskeletal:         General: No swelling.      Cervical back: Neck supple.   Skin:     Coloration: Skin is not jaundiced.      Findings: No erythema.   Neurological:      Mental Status: She is alert. Mental status is at baseline.   Psychiatric:         Mood and Affect: Mood and affect normal.         Thought Content: Thought content normal.     Assessment and Plan     Diagnoses and all orders for this visit:    1. Positive urine drug screen (Primary)    · UDS discussed with SUE Cornelius. Shared decision to continue currently prescribed medications with contingency that patient is referred to behavioral health for talk therapy regarding her anxiety in " other to provide better coping mechanisms. Will call patient in the AM to discuss.     Patient was given instructions and counseling regarding her condition or for health maintenance advice. Please see specific information pulled into the AVS if appropriate.

## 2022-10-18 RX ORDER — PROPRANOLOL HYDROCHLORIDE 20 MG/1
40 TABLET ORAL 2 TIMES DAILY PRN
Qty: 120 TABLET | Refills: 2
Start: 2022-10-18 | End: 2022-10-19 | Stop reason: SDUPTHER

## 2022-10-19 ENCOUNTER — TELEPHONE (OUTPATIENT)
Dept: FAMILY MEDICINE CLINIC | Facility: CLINIC | Age: 22
End: 2022-10-19

## 2022-10-19 DIAGNOSIS — F41.9 ANXIETY: Primary | ICD-10-CM

## 2022-10-19 RX ORDER — PROPRANOLOL HYDROCHLORIDE 20 MG/1
40 TABLET ORAL 2 TIMES DAILY PRN
Qty: 120 TABLET | Refills: 2 | Status: SHIPPED | OUTPATIENT
Start: 2022-10-19 | End: 2022-11-30

## 2022-10-19 NOTE — TELEPHONE ENCOUNTER
Caller: Hina Tucker    Relationship: Self    Best call back number: [phone]    What is the medical concern/diagnosis: ANXIETY    What specialty or service is being requested: BEHAVIORAL HEALTH    What is the provider, practice or medical service name: [facility]

## 2022-10-20 NOTE — TELEPHONE ENCOUNTER
Caller: Hina Tucker    Relationship: Self    Best call back number: [phone]     Requested Prescriptions:   Requested Prescriptions     Pending Prescriptions Disp Refills   • norelgestromin-ethinyl estradiol (ORTHO EVRA) 150-35 MCG/24HR 12 patch 0     Sig: Place 1 patch on the skin as directed by provider 1 (One) Time Per Week for 90 days.   DEXTROAMPHETAMINE 30 MG TWICE DAILY     Pharmacy where request should be sent: [facility] [id number] [address] - [phone] [facility] [phone] FX         Does the patient have less than a 3 day supply:  [x] Yes  [] No    Lee Callejas Rep   10/20/22 13:07 EDT

## 2022-10-21 DIAGNOSIS — F41.9 ANXIETY: Primary | ICD-10-CM

## 2022-10-21 RX ORDER — DEXTROAMPHETAMINE SACCHARATE, AMPHETAMINE ASPARTATE, DEXTROAMPHETAMINE SULFATE AND AMPHETAMINE SULFATE 7.5; 7.5; 7.5; 7.5 MG/1; MG/1; MG/1; MG/1
30 TABLET ORAL 2 TIMES DAILY
Qty: 60 TABLET | Refills: 0 | Status: SHIPPED | OUTPATIENT
Start: 2022-10-21 | End: 2022-11-21 | Stop reason: SDUPTHER

## 2022-10-21 NOTE — TELEPHONE ENCOUNTER
Pt called regarding referral and has an apt with Mery Nov 9th. Pt asking if you will refill  Adderall.

## 2022-11-11 ENCOUNTER — CLINICAL SUPPORT (OUTPATIENT)
Dept: FAMILY MEDICINE CLINIC | Facility: CLINIC | Age: 22
End: 2022-11-11

## 2022-11-11 DIAGNOSIS — Z11.1 VISIT FOR TB SKIN TEST: Primary | ICD-10-CM

## 2022-11-11 PROCEDURE — 86580 TB INTRADERMAL TEST: CPT | Performed by: FAMILY MEDICINE

## 2022-11-21 RX ORDER — DEXTROAMPHETAMINE SACCHARATE, AMPHETAMINE ASPARTATE, DEXTROAMPHETAMINE SULFATE AND AMPHETAMINE SULFATE 7.5; 7.5; 7.5; 7.5 MG/1; MG/1; MG/1; MG/1
30 TABLET ORAL 2 TIMES DAILY
Qty: 60 TABLET | Refills: 0 | Status: SHIPPED | OUTPATIENT
Start: 2022-11-21 | End: 2022-11-22 | Stop reason: SDUPTHER

## 2022-11-21 NOTE — TELEPHONE ENCOUNTER
Caller: Hina Tucker    Relationship: Self    Best call back number: [phone]    Requested Prescriptions:   Requested Prescriptions     Pending Prescriptions Disp Refills   • norelgestromin-ethinyl estradiol (ORTHO EVRA) 150-35 MCG/24HR 12 patch 0     Sig: Place 1 patch on the skin as directed by provider 1 (One) Time Per Week for 90 days.   • amphetamine-dextroamphetamine (Adderall) 30 MG tablet 60 tablet 0     Sig: Take 1 tablet by mouth 2 (Two) Times a Day for 30 days.        Pharmacy where request should be sent: [facility] [id number] [place], [place] - [address] [place] [address] - [phone] [facility] [phone] FX       Does the patient have less than a 3 day supply:  [x] Yes  [] No    Lee Conte Rep   11/21/22 11:26 EST

## 2022-11-22 ENCOUNTER — TELEPHONE (OUTPATIENT)
Dept: FAMILY MEDICINE CLINIC | Facility: CLINIC | Age: 22
End: 2022-11-22

## 2022-11-22 NOTE — TELEPHONE ENCOUNTER
Caller: Hina Tucker    Relationship: Self    Best call back number: [phone]    Requested Prescriptions:   Requested Prescriptions     Pending Prescriptions Disp Refills   • amphetamine-dextroamphetamine (Adderall) 30 MG tablet 60 tablet 0     Sig: Take 1 tablet by mouth 2 (Two) Times a Day for 30 days.   • norelgestromin-ethinyl estradiol (ORTHO EVRA) 150-35 MCG/24HR 12 patch 0     Sig: Place 1 patch on the skin as directed by provider 1 (One) Time Per Week for 90 days.        Pharmacy where request should be sent: [facility] [address] [place], [place] [address] [facility] [phone] [facility] [phone] FX     Additional details provided by patient: PATIENT [place] OUT [place] TOWN AND IS OUT OF THE MEDICATIONS. THE MEDICINES WERE SENT TO WRONG PHARMACY, SHE IS NEEDING THIS SENT TO [facility] AT [facility] BUT IT HAS TO BE ANOTHER SCRIPT SINCE IT IS A CLASS C2 DRUG.     Does the patient have less than a 3 day supply:  [x] Yes  [] No    Lee Montilla Rep   11/22/22 14:34 EST

## 2022-11-23 RX ORDER — DEXTROAMPHETAMINE SACCHARATE, AMPHETAMINE ASPARTATE, DEXTROAMPHETAMINE SULFATE AND AMPHETAMINE SULFATE 7.5; 7.5; 7.5; 7.5 MG/1; MG/1; MG/1; MG/1
30 TABLET ORAL 2 TIMES DAILY
Qty: 60 TABLET | Refills: 0 | Status: SHIPPED | OUTPATIENT
Start: 2022-11-23 | End: 2022-11-23 | Stop reason: SDUPTHER

## 2022-11-23 RX ORDER — DEXTROAMPHETAMINE SACCHARATE, AMPHETAMINE ASPARTATE, DEXTROAMPHETAMINE SULFATE AND AMPHETAMINE SULFATE 7.5; 7.5; 7.5; 7.5 MG/1; MG/1; MG/1; MG/1
30 TABLET ORAL 2 TIMES DAILY
Qty: 60 TABLET | Refills: 0 | Status: SHIPPED | OUTPATIENT
Start: 2022-11-23 | End: 2022-11-28 | Stop reason: SDUPTHER

## 2022-11-23 NOTE — TELEPHONE ENCOUNTER
Hi :     I sent this to the [facility] in [place] this morning. Please let the patient know to  her medication there.    Thank you!

## 2022-11-23 NOTE — TELEPHONE ENCOUNTER
Caller: Hina Tucker A    Relationship to patient: Self    Best call back number: [phone]    Patient is needing: PATIENT IS OUT OF MEDICATION AND IS REQUESTING A REFILL BE SENT TO [facility] BECAUSE HER OTHER PHARMACY IS OUT   amphetamine-dextroamphetamine (Adderall) 30 MG tablet  30 mg, 2 Times Daily         [facility]/pharmacy #[id number] - [place], [place] - [address] [place] [address] AT [address] [phone] [place] [phone]   [phone]

## 2022-11-28 ENCOUNTER — TELEPHONE (OUTPATIENT)
Dept: FAMILY MEDICINE CLINIC | Facility: CLINIC | Age: 22
End: 2022-11-28

## 2022-11-28 RX ORDER — DEXTROAMPHETAMINE SACCHARATE, AMPHETAMINE ASPARTATE, DEXTROAMPHETAMINE SULFATE AND AMPHETAMINE SULFATE 7.5; 7.5; 7.5; 7.5 MG/1; MG/1; MG/1; MG/1
30 TABLET ORAL 2 TIMES DAILY
Qty: 39 TABLET | Refills: 0 | Status: SHIPPED | OUTPATIENT
Start: 2022-11-28 | End: 2022-12-28
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
## 2022-11-30 ENCOUNTER — TELEMEDICINE (OUTPATIENT)
Dept: PSYCHIATRY | Facility: CLINIC | Age: 22
End: 2022-11-30

## 2022-11-30 DIAGNOSIS — F41.1 GENERALIZED ANXIETY DISORDER: Primary | ICD-10-CM

## 2022-11-30 DIAGNOSIS — G47.9 SLEEP DISTURBANCE: ICD-10-CM

## 2022-11-30 DIAGNOSIS — F33.1 MAJOR DEPRESSIVE DISORDER, RECURRENT EPISODE, MODERATE: ICD-10-CM

## 2022-11-30 PROCEDURE — 90792 PSYCH DIAG EVAL W/MED SRVCS: CPT

## 2022-11-30 RX ORDER — BUSPIRONE HYDROCHLORIDE 10 MG/1
10 TABLET ORAL 3 TIMES DAILY
Qty: 90 TABLET | Refills: 0 | Status: SHIPPED | OUTPATIENT
Start: 2022-11-30

## 2022-11-30 RX ORDER — HYDROXYZINE 50 MG/1
50 TABLET, FILM COATED ORAL NIGHTLY PRN
Qty: 30 TABLET | Refills: 0 | Status: SHIPPED | OUTPATIENT
Start: 2022-11-30

## 2022-12-02 ENCOUNTER — TELEPHONE (OUTPATIENT)
Dept: FAMILY MEDICINE CLINIC | Facility: CLINIC | Age: 22
End: 2022-12-02

## 2022-12-02 NOTE — TELEPHONE ENCOUNTER
Caller: Hina Tucker    Relationship: Self    Best call back number: [phone]    What form or medical record are you requesting: A FAX STATING THE MEDICATIONS SHE IS PRESCRIBED. HER WORK NEEDS THIS.        How would you like to receive the form or medical records (pick-up, mail, fax): .[phone] [facility] [place]

## 2023-01-03 ENCOUNTER — TELEPHONE (OUTPATIENT)
Dept: FAMILY MEDICINE CLINIC | Facility: CLINIC | Age: 23
End: 2023-01-03

## 2023-01-03 NOTE — TELEPHONE ENCOUNTER
Caller: Hina Tucker    Relationship: Self    Best call back number: [phone]    What medication are you requesting: ANTIBIOTIC     What are your current symptoms: PAIN ON BACK RIGHT BOTTOM TOOTH     How long have you been experiencing symptoms: 1 WEEK     Have you had these symptoms before:    [x] Yes  [] No    Have you been treated for these symptoms before:   [x] Yes  [] No    If a prescription is needed, what is your preferred pharmacy and phone number:      [facility] [id number] [place] [facility] [address] - [phone] [facility] [phone]   [phone]

## 2023-01-03 NOTE — TELEPHONE ENCOUNTER
Caller: Hina Tucker    Relationship: Self    Best call back number:[phone]    Requested Prescriptions:        ADDERALL 30 MG     Pharmacy where request should be sent: [facility] [id number] [address] - [phone]  - [phone] FX     Additional details provided by patient: OUT OF MEDICATION     Does the patient have less than a 3 day supply:  [] Yes  [] No    Would you like a call back once the refill request has been completed: [x] Yes [] No    If the office needs to give you a call back, can they leave a voicemail: [x] Yes [] No    Lee Conte Rep   01/03/23 13:04 EST

## 2023-01-05 ENCOUNTER — TELEPHONE (OUTPATIENT)
Dept: FAMILY MEDICINE CLINIC | Facility: CLINIC | Age: 23
End: 2023-01-05
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
## 2023-01-05 NOTE — TELEPHONE ENCOUNTER
Called and left patient a message stating that we will not be able to send her in any medications until she is here in the office for an appointment.

## 2023-01-16 ENCOUNTER — OFFICE VISIT (OUTPATIENT)
Dept: FAMILY MEDICINE CLINIC | Facility: CLINIC | Age: 23
End: 2023-01-16
Payer: MEDICAID

## 2023-01-16 ENCOUNTER — TELEPHONE (OUTPATIENT)
Dept: FAMILY MEDICINE CLINIC | Facility: CLINIC | Age: 23
End: 2023-01-16
Payer: MEDICAID

## 2023-01-16 VITALS
TEMPERATURE: 97.3 F | WEIGHT: 112 LBS | HEART RATE: 115 BPM | HEIGHT: 62 IN | BODY MASS INDEX: 20.61 KG/M2 | SYSTOLIC BLOOD PRESSURE: 110 MMHG | DIASTOLIC BLOOD PRESSURE: 68 MMHG | OXYGEN SATURATION: 100 %

## 2023-01-16 DIAGNOSIS — Z79.899 DRUG THERAPY: ICD-10-CM

## 2023-01-16 DIAGNOSIS — F90.0 ATTENTION DEFICIT HYPERACTIVITY DISORDER (ADHD), PREDOMINANTLY INATTENTIVE TYPE: Primary | ICD-10-CM

## 2023-01-16 DIAGNOSIS — K08.89 TOOTH PAIN: ICD-10-CM

## 2023-01-16 PROCEDURE — 99213 OFFICE O/P EST LOW 20 MIN: CPT | Performed by: FAMILY MEDICINE

## 2023-01-16 RX ORDER — SACCHAROMYCES BOULARDII 250 MG
250 CAPSULE ORAL 2 TIMES DAILY
Qty: 20 CAPSULE | Refills: 0 | Status: SHIPPED | OUTPATIENT
Start: 2023-01-16 | End: 2023-01-26

## 2023-01-16 RX ORDER — DEXTROAMPHETAMINE SACCHARATE, AMPHETAMINE ASPARTATE, DEXTROAMPHETAMINE SULFATE AND AMPHETAMINE SULFATE 7.5; 7.5; 7.5; 7.5 MG/1; MG/1; MG/1; MG/1
30 TABLET ORAL 2 TIMES DAILY
Qty: 39 TABLET | Refills: 0 | OUTPATIENT
Start: 2023-01-16 | End: 2023-02-15

## 2023-01-16 RX ORDER — DEXTROAMPHETAMINE SACCHARATE, AMPHETAMINE ASPARTATE, DEXTROAMPHETAMINE SULFATE AND AMPHETAMINE SULFATE 7.5; 7.5; 7.5; 7.5 MG/1; MG/1; MG/1; MG/1
30 TABLET ORAL DAILY
COMMUNITY
End: 2023-01-16

## 2023-01-16 RX ORDER — CLINDAMYCIN HYDROCHLORIDE 300 MG/1
300 CAPSULE ORAL 4 TIMES DAILY
Qty: 28 CAPSULE | Refills: 0 | Status: SHIPPED | OUTPATIENT
Start: 2023-01-16 | End: 2023-01-17

## 2023-01-16 RX ORDER — GUANFACINE 1 MG/1
1 TABLET, EXTENDED RELEASE ORAL
Qty: 30 TABLET | Refills: 1 | Status: SHIPPED | OUTPATIENT
Start: 2023-01-16

## 2023-01-16 NOTE — PROGRESS NOTES
Chief Complaint  Dental Pain (Wants an antibiotic til she can find a dentist) and ADD (Discuss medication)    Subjective          Hina JORGE Tucker presents to [facility]  History of Present Illness  Pt has chronic ADD- pt has positive tox screen for THC- pt willing to try non-controlled med for ADD- pt has no SI or HI  Pt will be seeing psychiatry in March 2023  Pt has had right lower back dental pain x 1+ weeks- pt is in process of getting into dentist- needing antibiotic for tooth    Pt says no chance of pregnancy.  Pt says that she normally sees Dr. Teague but not able to see her today      Objective   Allergies   Allergen Reactions   • Amoxicillin Anaphylaxis   • Penicillin G Anaphylaxis     Immunization History   Administered Date(s) Administered   • COVID-19 (MODERNA) 1st, 2nd, 3rd Dose Only 11/30/2021   • DTP 2000, 2000, 05/14/2001, 11/14/2002, 07/15/2004   • DTaP 2000, 2000, 05/14/2001, 11/14/2002, 07/15/2004   • HPV Quadrivalent 09/22/2012, 12/08/2012, 10/02/2013   • Hep A, 2 Dose 11/06/2015, 10/06/2016   • Hep B, Adolescent or Pediatric 2000, 2000, 05/14/2001   • Hep B, Unspecified 2000, 2000, 05/14/2001   • HiB 2000, 2000, 05/14/2001, 11/14/2002   • Hib (PRP-OMP) 2000, 2000, 05/14/2001, 11/14/2002   • IPV 2000, 2000, 05/14/2001, 07/15/2004   • MMR 10/27/2001, 07/15/2004   • Meningococcal B,(Bexsero) 10/06/2016   • Meningococcal MCV4P (Menactra) 07/18/2011, 10/06/2016   • PPD Test 08/06/2019, 08/14/2019, 11/11/2022   • Polio, Unspecified 2000, 2000, 05/14/2001, 07/15/2004   • Tdap 07/18/2011, 12/10/2020   • Varicella 07/25/2001, 07/18/2011     Past Medical History:   Diagnosis Date   • ADHD (attention deficit hyperactivity disorder)    • Anxiety and depression       History reviewed. No pertinent surgical history.   Social History     Socioeconomic History   • Marital status:  Single   • Number of children: 1   Tobacco Use   • Smoking status: Former     Packs/day: 0.50     Years: 4.00     Pack years: 2.00     Types: Cigarettes     Start date: 2016   • Smokeless tobacco: Never   Vaping Use   • Vaping Use: Every day   • Substances: Nicotine, THC, Flavoring, a few times throughout the day   • Devices: Disposable   Substance and Sexual Activity   • Alcohol use: No   • Drug use: No   • Sexual activity: Defer        Current Outpatient Medications:   •  hydrOXYzine (ATARAX) 50 MG tablet, Take 1 tablet by mouth At Night As Needed for Anxiety., Disp: 30 tablet, Rfl: 0  •  norelgestromin-ethinyl estradiol (ORTHO EVRA) 150-35 MCG/24HR, Place 1 patch on the skin as directed by provider 1 (One) Time Per Week for 90 days., Disp: 12 patch, Rfl: 0  •  busPIRone (BUSPAR) 10 MG tablet, Take 1 tablet by mouth 3 (Three) Times a Day., Disp: 90 tablet, Rfl: 0  •  clindamycin (Cleocin) 300 MG capsule, Take 1 capsule by mouth 4 (Four) Times a Day for 7 days., Disp: 28 capsule, Rfl: 0  •  guanFACINE HCl ER (INTUNIV) 1 MG tablet sustained-release 24 hour, Take 1 mg by mouth every night at bedtime., Disp: 30 tablet, Rfl: 1  •  saccharomyces boulardii (Florastor) 250 MG capsule, Take 1 capsule by mouth 2 (Two) Times a Day for 10 days., Disp: 20 capsule, Rfl: 0   Family History   Problem Relation Age of Onset   • Suicide Attempts Mother    • Self-Injurious Behavior  Mother    • Anxiety disorder Mother    • Depression Mother    • Bipolar disorder Mother    • Self-Injurious Behavior  Sister    • Depression Sister    • Anxiety disorder Sister    • Bipolar disorder Sister    • Stroke Maternal Grandfather    • COPD Maternal Grandmother    • Anxiety disorder Sister    • Bipolar disorder Sister    • Depression Sister    • Self-Injurious Behavior  Sister           Vital Signs:   Vitals:    01/16/23 1030   BP: 110/68   BP Location: Left arm   Pulse: 115   Temp: 97.3 °F (36.3 °C)   SpO2: 100%   Weight: 50.8 kg (112 lb)  "  Height: 157.5 cm (62\")       Review of Systems   Constitutional: Negative for fatigue and fever.   HENT: Negative for sore throat.    Eyes: Negative for visual disturbance.   Respiratory: Negative for cough, chest tightness, shortness of breath and wheezing.    Cardiovascular: Negative for chest pain, palpitations and leg swelling.   Gastrointestinal: Negative for abdominal pain, diarrhea, nausea and vomiting.   Skin: Negative for rash.   Neurological: Negative for light-headedness and headaches.   Psychiatric/Behavioral: Positive for decreased concentration. Negative for dysphoric mood, sleep disturbance and suicidal ideas. The patient is nervous/anxious.       Physical Exam  Vitals reviewed.   Constitutional:       Appearance: Normal appearance. She is well-developed.   HENT:      Head: Normocephalic and atraumatic.      Right Ear: External ear normal.      Left Ear: External ear normal.      Mouth/Throat:      Mouth: Mucous membranes are moist.      Pharynx: Oropharynx is clear. No oropharyngeal exudate or posterior oropharyngeal erythema.      Comments: Right back lower tooth chipped, no gum swelling, redness, warmth, no discharge, no facial tenderness, swelling, redness, warmth, no neck swelling or redness, or warmth.  Eyes:      Conjunctiva/sclera: Conjunctivae normal.      Pupils: Pupils are equal, round, and reactive to light.   Cardiovascular:      Rate and Rhythm: Normal rate and regular rhythm.      Pulses: Normal pulses.      Heart sounds: Normal heart sounds. No murmur heard.    No friction rub. No gallop.   Pulmonary:      Effort: Pulmonary effort is normal.      Breath sounds: Normal breath sounds. No wheezing or rhonchi.   Abdominal:      General: Bowel sounds are normal. There is no distension.      Palpations: Abdomen is soft.      Tenderness: There is no abdominal tenderness.   Musculoskeletal:         General: Normal range of motion.      Cervical back: Normal range of motion and neck supple. "   Skin:     General: Skin is warm and dry.      Capillary Refill: Capillary refill takes less than 2 seconds.   Neurological:      General: No focal deficit present.      Mental Status: She is alert and oriented to person, place, and time.      Cranial Nerves: No cranial nerve deficit.   Psychiatric:         Mood and Affect: Mood and affect normal.         Behavior: Behavior normal.         Thought Content: Thought content normal.         Judgment: Judgment normal.        Result Review :   The following data was reviewed by: José Miguel López MD on 01/16/2023:                            Assessment and Plan    Diagnoses and all orders for this visit:    1. Attention deficit hyperactivity disorder (ADHD), predominantly inattentive type (Primary)  -     guanFACINE HCl ER (INTUNIV) 1 MG tablet sustained-release 24 hour; Take 1 mg by mouth every night at bedtime.  Dispense: 30 tablet; Refill: 1  -     CBC Auto Differential  -     Comprehensive Metabolic Panel  -     TSH+Free T4    2. Tooth pain  -     clindamycin (Cleocin) 300 MG capsule; Take 1 capsule by mouth 4 (Four) Times a Day for 7 days.  Dispense: 28 capsule; Refill: 0  -     saccharomyces boulardii (Florastor) 250 MG capsule; Take 1 capsule by mouth 2 (Two) Times a Day for 10 days.  Dispense: 20 capsule; Refill: 0  -     CBC Auto Differential  -     Comprehensive Metabolic Panel    3. Drug therapy  -     CBC Auto Differential  -     Comprehensive Metabolic Panel  -     TSH+Free T4            Follow Up   Return in about 2 weeks (around 1/30/2023) for Recheck- pt is to see Dr. Teague.  Patient was given instructions and counseling regarding her condition or for health maintenance advice. Please see specific information pulled into the AVS if appropriate.     Pt will see her dentist soon.  Pt told to go to ER if have any facial or neck swelling, pain, redness, or warmth, or trouble swallowing or breathing or talking.

## 2023-01-16 NOTE — TELEPHONE ENCOUNTER
Caller: Hina Tucker    Relationship: Self    Best call back number: [phone]    Requested Prescriptions:   AMPHETAMINE DEXTROAMPHETAMINE 30 MG TWICE DAILY    Pharmacy where request should be sent:    [id number] - [phone]  - [phone]   [phone]    Additional details provided by patient: PATIENT IS CURRENTLY OUT OF THIS MEDICATION. PATIENT ATTEMPTED TO GET THIS FILLED DURING APPOINTMENT WITH [facility] TODAY, 01/16/2023. HOWEVER, IT WAS UNABLE TO BE REFILLED. SHE IS SCHEDULED FOR 01/17/2023 BUT SHE HAS TRANSPORTATION ISSUES. SHE WOULD LIKE A CALL TO LET HER KNOW IF THIS CAN OR CANNOT BE REFILLED.     Does the patient have less than a 3 day supply:  [x] Yes  [] No    Would you like a call back once the refill request has been completed: [x] Yes [] No    If the office needs to give you a call back, can they leave a voicemail: [x] Yes [] No    Lee Montilla Rep   01/16/23 11:08 EST

## 2023-01-16 NOTE — TELEPHONE ENCOUNTER
Hub staff attempted to follow warm transfer process and was unsuccessful      Caller: Hina Tucker A    Relationship to patient: Self    Best call back number: [phone]    Patient is needing: PATIENT IS RETURNING [facility]AND[facility]'S CALL.

## 2023-01-17 ENCOUNTER — OFFICE VISIT (OUTPATIENT)
Dept: FAMILY MEDICINE CLINIC | Facility: CLINIC | Age: 23
End: 2023-01-17
Payer: MEDICAID

## 2023-01-17 VITALS
TEMPERATURE: 97.7 F | HEART RATE: 110 BPM | BODY MASS INDEX: 20.68 KG/M2 | OXYGEN SATURATION: 100 % | WEIGHT: 112.4 LBS | HEIGHT: 62 IN

## 2023-01-17 DIAGNOSIS — F90.0 ATTENTION DEFICIT HYPERACTIVITY DISORDER (ADHD), PREDOMINANTLY INATTENTIVE TYPE: Primary | ICD-10-CM

## 2023-01-17 PROCEDURE — 99213 OFFICE O/P EST LOW 20 MIN: CPT | Performed by: FAMILY MEDICINE

## 2023-01-17 RX ORDER — ATOMOXETINE 18 MG/1
18 CAPSULE ORAL DAILY
Qty: 30 CAPSULE | Refills: 0 | Status: SHIPPED | OUTPATIENT
Start: 2023-01-17 | End: 2023-02-13

## 2023-01-17 NOTE — PROGRESS NOTES
"Chief Complaint    ADHD (Wants to discuss medication)    Subjective      Hina Tucker presents to [facility]    History of Present Illness    1.) ADHD : Patient presents for follow-up regarding ADHD.  Recent urine drug screen significant for THC and benzodiazepine.  Discussed during a prior visit here in office.  Patient has been advised referral to psychiatry secondary to complaints regarding anxiety.  She admits to one visit with psychiatry and is now follow-up in March.  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    Objective     Vital Signs:     Pulse 110   Temp 97.7 °F (36.5 °C) (Temporal)   Ht 157.5 cm (62\")   Wt 51 kg (112 lb 6.4 oz)   SpO2 100%   BMI 20.56 kg/m²       Physical Exam  Vitals reviewed.   Constitutional:       General: She is not in acute distress.     Appearance: Normal appearance. She is well-developed.   HENT:      Head: Normocephalic and atraumatic.      Right Ear: Hearing and external ear normal.      Left Ear: Hearing and external ear normal.      Nose: Nose normal.   Eyes:      General: Lids are normal.         Right eye: No discharge.         Left eye: No discharge.      Conjunctiva/sclera: Conjunctivae normal.   Pulmonary:      Effort: Pulmonary effort is normal.   Abdominal:      General: There is no distension.   Musculoskeletal:         General: No swelling.      Cervical back: Neck supple.   Skin:     Coloration: Skin is not jaundiced.      Findings: No erythema.   Neurological:      Mental Status: She is alert. Mental status is at baseline.   Psychiatric:         Mood and Affect: Mood and affect normal.         Thought Content: Thought content normal.     Assessment and Plan     Diagnoses and all orders for this visit:    1. Attention deficit hyperactivity disorder (ADHD), predominantly inattentive type (Primary)  Comments:  Pt will start atomoxetine as noted. Follow up with psychiatry regarding anxiety. Expect to follow up in office in " 1 month.    Other orders  -     atomoxetine (Strattera) 18 MG capsule; Take 1 capsule by mouth Daily for 30 days.  Dispense: 30 capsule; Refill: 0    Follow Up     Return in about 1 month (around 2/17/2023).    Patient was given instructions and counseling regarding her condition or for health maintenance advice. Please see specific information pulled into the AVS if appropriate.

## 2023-02-13 RX ORDER — ATOMOXETINE 18 MG/1
CAPSULE ORAL
Qty: 30 CAPSULE | Refills: 0 | Status: SHIPPED | OUTPATIENT
Start: 2023-02-13

## 2023-09-08 ENCOUNTER — TELEPHONE (OUTPATIENT)
Dept: FAMILY MEDICINE CLINIC | Facility: CLINIC | Age: 23
End: 2023-09-08
Payer: MEDICAID

## 2023-09-08 DIAGNOSIS — F90.0 ATTENTION DEFICIT HYPERACTIVITY DISORDER (ADHD), PREDOMINANTLY INATTENTIVE TYPE: Primary | ICD-10-CM

## 2023-09-08 NOTE — TELEPHONE ENCOUNTER
Patient would like to proceed with the mental health referral for management of her ADHD diagnosis that was discussed in the MyChart communication.

## 2023-11-07 ENCOUNTER — TELEPHONE (OUTPATIENT)
Dept: INTERNAL MEDICINE | Facility: CLINIC | Age: 23
End: 2023-11-07
Payer: MEDICAID

## 2023-11-07 ENCOUNTER — OFFICE VISIT (OUTPATIENT)
Dept: BEHAVIORAL HEALTH | Facility: CLINIC | Age: 23
End: 2023-11-07
Payer: MEDICAID

## 2023-11-07 ENCOUNTER — CLINICAL SUPPORT (OUTPATIENT)
Dept: INTERNAL MEDICINE | Facility: CLINIC | Age: 23
End: 2023-11-07
Payer: MEDICAID

## 2023-11-07 VITALS
HEIGHT: 62 IN | HEART RATE: 102 BPM | WEIGHT: 159.7 LBS | SYSTOLIC BLOOD PRESSURE: 112 MMHG | DIASTOLIC BLOOD PRESSURE: 69 MMHG | OXYGEN SATURATION: 100 % | BODY MASS INDEX: 29.39 KG/M2

## 2023-11-07 DIAGNOSIS — Z79.899 MEDICATION MANAGEMENT: Primary | ICD-10-CM

## 2023-11-07 DIAGNOSIS — F90.1 ATTENTION DEFICIT HYPERACTIVITY DISORDER (ADHD), PREDOMINANTLY HYPERACTIVE TYPE: ICD-10-CM

## 2023-11-07 DIAGNOSIS — Z79.899 MEDICATION MANAGEMENT: ICD-10-CM

## 2023-11-07 DIAGNOSIS — F41.1 GENERALIZED ANXIETY DISORDER: ICD-10-CM

## 2023-11-07 DIAGNOSIS — F33.1 MODERATE EPISODE OF RECURRENT MAJOR DEPRESSIVE DISORDER: Primary | ICD-10-CM

## 2023-11-07 PROCEDURE — 80305 DRUG TEST PRSMV DIR OPT OBS: CPT | Performed by: INTERNAL MEDICINE

## 2023-11-07 RX ORDER — VENLAFAXINE HYDROCHLORIDE 75 MG/1
75 CAPSULE, EXTENDED RELEASE ORAL DAILY
Qty: 30 CAPSULE | Refills: 1 | Status: SHIPPED | OUTPATIENT
Start: 2023-11-07

## 2023-11-07 RX ORDER — SERDEXMETHYLPHENIDATE AND DEXMETHYLPHENIDATE 5.2; 26.1 MG/1; MG/1
26.1 CAPSULE ORAL DAILY
Qty: 30 CAPSULE | Refills: 0 | Status: SHIPPED | OUTPATIENT
Start: 2023-11-07

## 2023-11-07 RX ORDER — VENLAFAXINE HYDROCHLORIDE 37.5 MG/1
CAPSULE, EXTENDED RELEASE ORAL
COMMUNITY
Start: 2023-10-18 | End: 2023-11-07 | Stop reason: SDUPTHER

## 2023-11-07 NOTE — PROGRESS NOTES
UDS per Abi Mariee [facility]

## 2023-11-07 NOTE — PROGRESS NOTES
"[facility]/Psychiatry  Initial Psychiatric Evaluation    Referring Provider:   Thank you   Lianet Teague,   [address] [place] [address]  Your referral is greatly appreciated.    Vital Signs:   /69   Pulse 102   Ht 157.5 cm (62\")   Wt 72.4 kg (159 lb 11.2 oz)   SpO2 100%   BMI 29.21 kg/m²      Chief Complaint: ADHD. Depression.     History of Present Illness:   Hina Tucker is a 23 y.o. female who presents today for initial psychiatric evaluation for:     ICD-10-CM ICD-9-CM   1. Moderate episode of recurrent major depressive disorder  F33.1 296.32   2. Attention deficit hyperactivity disorder (ADHD), predominantly hyperactive type  F90.1 314.01   3. Generalized anxiety disorder  F41.1 300.02   4. Medication management  Z79.899 V58.69       11/07/2023 Stopped taking her ADHD medication at age 17 because she didn't think she would need it anymore. \"I feel like I can't function in any area of my life\" Struggles with impulsivity, puts her foot in her mouth a lot, she has lost several jobs, trouble with grocery shopping. She is going to therapy weekly.   Depression  Visit Type: initial  Onset of symptoms: more than 5 years ago  Progression since onset: gradually worsening  Patient presents with the following symptoms: anhedonia, decreased concentration, depressed mood, excessive worry, fatigue, feelings of hopelessness, feelings of worthlessness, insomnia, irritability, memory impairment, nervousness/anxiety, psychomotor agitation and restlessness.  Patient is not experiencing: suicidal ideas, suicidal planning and thoughts of death.  Frequency of symptoms: most days   Severity: causing significant distress   Sleep quality: poor    Denies suicidal ideation. Denies AVH.       ADHD:  Symptoms are persistent and significantly interfere with major life activities and/or result in significant suffering  With focus on K5 through 6th grade only   Grades: Poor  Behavioral issues: \"I have " "gotten in trouble my whole life\"  Special Classes:Denies  Inattention:Yes  Referral for ADHD testing: Diagnosed age 6, Adderall     Often fails to give close attention to details or makes careless mistakes in schoolwork, at work, or during other activities:Yes  Often has difficulty sustaining attention in tasks:Yes  Often does not seem to listen when spoken to directly:Yes  Often does not follow through on instructions and fails to finish duties in the workplace:Yes  Often has difficulty organizing tasks and activities:Yes  Often avoids, dislikes or is reluctant to engage in tasks that require sustained mental effort:Yes  Often loses things necessary for tasks or activities:Yes  Is often easily distracted by extraneous stimuli: Yes  Is often forgetful in daily activities: Yes  Hyperactivity and Impulsivity: Yes  Often fidgets with or taps hands or feet: Yes  Often leaves seat in situations when remaining seated is expected: Yes  Often feels restless: Yes  Is often “on the go”, acting as if “driven by a motor”: Yes  Often talks excessively: Yes  Often blurts out an answer before a question has been completed: Yes  Often has difficulty waiting their turn: Yes  Often interrupts or intrudes on others: Yes    Record Review for 11/07/2023 : I have thoroughly reviewed the patient's electronic medical record to include previous encounters, care everywhere, notes, medications, labs, GABY and UDS (if applicable), imaging, and EKG's.  Pertinent information is included in this note.  No current or pertinent labs  EKG Results:  None in record  Head Imaging:  None in record    Per Referring Provider 1/17/2023  ADHD : Patient presents for follow-up regarding ADHD.  Recent urine drug screen significant for THC and benzodiazepine.  Discussed during a prior visit here in office.  Patient has been advised referral to psychiatry secondary to complaints regarding anxiety.  She admits to one visit with psychiatry and is now follow-up " in March.  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     Past Psychiatric History:  Began Treatment: Age 6  Diagnoses: ADHD, anxiety, ODD,   Psychiatrist: Yes  Therapist: [facility]  Admission History: Several admissions as a child from age [age]  Medication Trials: Adderall, lexapro, zoloft, abilify, seroquel, guanfacine, hydroxyzine, buspar, effexor XR, atomoxetine  Suicide Attempts: Denies  Self Harm: Hx of cutting, 7 years since last incident, superficial scarring joseluis arms, upper thighs  Substance use/abuse:Used to smoke marijuana, she has quit, she thought it was helping her symptoms   Withdrawal Symptoms: Not applicable  Longest Period Sober: Not applicable  AA: Not applicable  Trauma/Childhood/ACE:  Mom suffered with Alcohol use disorder  Access to Firearms: Denies    MENTAL STATUS EXAM   General Appearance:  Cleanly groomed and dressed and well developed  Eye Contact:  Good eye contact  Attitude:  Cooperative and polite  Motor Activity:  Normal gait, posture  Speech:  Normal rate, tone, volume  Mood and affect:  Normal, pleasant and euthymic  Hopelessness:  Denies  Thought Process:  Logical and goal-directed  Associations/ Thought Content:  No delusions  Hallucinations:  None  Suicidal Ideations:  Not present  Homicidal Ideation:  Not present  Sensorium:  Alert  Orientation:  Person, place, time and situation  Immediate Recall, Recent, and Remote Memory:  Intact  Attention Span/ Concentration:  Good  Fund of Knowledge:  Appropriate for age and educational level  Intellectual Functioning:  Average range  Insight:  Good  Judgement:  Good  Reliability:  Good  Impulse Control:  Good     PHQ-9 Depression Screening  PHQ-9 Total Score: 20    Little interest or pleasure in doing things? 3-->nearly every day   Feeling down, depressed, or hopeless? 2-->more than half the days   Trouble falling or staying asleep, or sleeping too much? 2-->more than half the days   Feeling tired or having little energy?  3-->nearly every day   Poor appetite or overeating? 3-->nearly every day   Feeling bad about yourself - or that you are a failure or have let yourself or your family down? 3-->nearly every day   Trouble concentrating on things, such as reading the newspaper or watching television? 3-->nearly every day   Moving or speaking so slowly that other people could have noticed? Or the opposite - being so fidgety or restless that you have been moving around a lot more than usual? 1-->several days   Thoughts that you would be better off dead, or of hurting yourself in some way? 0-->not at all   PHQ-9 Total Score 20     ROSIE-7  Feeling nervous, anxious or on edge: More than half the days  Not being able to stop or control worrying: More than half the days  Worrying too much about different things: Nearly every day  Trouble Relaxing: Nearly every day  Being so restless that it is hard to sit still: More than half the days  Feeling afraid as if something awful might happen: Not at all  Becoming easily annoyed or irritable: Nearly every day  ROSIE 7 Total Score: 15  If you checked any problems, how difficult have these problems made it for you to do your work, take care of things at home, or get along with other people: Extremely difficult  Review of systems is negative except for as noted in HPI.  Labs:  WBC   Date Value Ref Range Status   10/12/2021 2.65 (L) 3.40 - 10.80 10*3/mm3 Final     Platelets   Date Value Ref Range Status   10/12/2021 267 140 - 450 10*3/mm3 Final     Hemoglobin   Date Value Ref Range Status   10/12/2021 11.0 (L) 12.0 - 15.9 g/dL Final     Hematocrit   Date Value Ref Range Status   10/12/2021 34.4 34.0 - 46.6 % Final     Glucose   Date Value Ref Range Status   10/12/2021 77 65 - 99 mg/dL Final     Creatinine   Date Value Ref Range Status   10/12/2021 0.61 0.57 - 1.00 mg/dL Final     ALT (SGPT)   Date Value Ref Range Status   10/12/2021 11 1 - 33 U/L Final     AST (SGOT)   Date Value Ref Range Status    10/12/2021 16 1 - 32 U/L Final     BUN   Date Value Ref Range Status   10/12/2021 9 6 - 20 mg/dL Final     Total Cholesterol   Date Value Ref Range Status   10/12/2021 137 0 - 200 mg/dL Final     Triglycerides   Date Value Ref Range Status   10/12/2021 54 0 - 150 mg/dL Final     HDL Cholesterol   Date Value Ref Range Status   10/12/2021 44 40 - 60 mg/dL Final     LDL Cholesterol    Date Value Ref Range Status   10/12/2021 81 0 - 100 mg/dL Final     VLDL Cholesterol   Date Value Ref Range Status   10/12/2021 12 5 - 40 mg/dL Final     LDL/HDL Ratio   Date Value Ref Range Status   10/12/2021 1.87  Final     Hemoglobin A1C   Date Value Ref Range Status   09/13/2022 5.30 4.80 - 5.60 % Final     TSH   Date Value Ref Range Status   10/12/2021 0.768 0.270 - 4.200 uIU/mL Final     Last Urine Toxicity  More data may exist         Latest Ref Rng & Units 11/7/2023 10/12/2022   LAST URINE TOXICITY RESULTS   Amphetamine, Urine Qual Negative Negative  -   Barbiturates Screen, Urine Negative Negative  Negative    Benzodiazepine Screen, Urine Negative Negative  Positive    Buprenorphine, Screen, Urine Negative Negative  -   Cocaine Screen, Urine Negative Negative  Negative    Methadone Screen , Urine Negative Negative  Negative    Methamphetamine, Ur Negative Negative  -        Imaging Results:  No Images in the past 120 days found..  Allergy:   Allergies   Allergen Reactions    Amoxicillin Anaphylaxis    Penicillin G Anaphylaxis      Problem List:  Patient Active Problem List   Diagnosis    Anxiety and depression    Attention deficit hyperactivity disorder (ADHD), predominantly inattentive type     Current Medications:   Current Outpatient Medications   Medication Sig Dispense Refill    venlafaxine XR (EFFEXOR-XR) 75 MG 24 hr capsule Take 1 capsule by mouth Daily. 30 capsule 1    norelgestromin-ethinyl estradiol (ORTHO EVRA) 150-35 MCG/24HR Place 1 patch on the skin as directed by provider 1 (One) Time Per Week for 90 days.  (Patient not taking: Reported on 11/7/2023) 12 patch 0     No current facility-administered medications for this visit.     Discontinued Medications:  Medications Discontinued During This Encounter   Medication Reason    guanFACINE HCl ER (INTUNIV) 1 MG tablet sustained-release 24 hour Historical Med - Therapy completed    busPIRone (BUSPAR) 10 MG tablet Historical Med - Therapy completed    hydrOXYzine (ATARAX) 50 MG tablet Historical Med - Therapy completed    atomoxetine (STRATTERA) 18 MG capsule Historical Med - Therapy completed    venlafaxine XR (EFFEXOR-XR) 37.5 MG 24 hr capsule Reorder     Past Surgical History:  History reviewed. No pertinent surgical history.  Past Medical History:  Past Medical History:   Diagnosis Date    Anxiety and depression     Self-injurious behavior      Social History     Socioeconomic History    Marital status: Single    Number of children: 1   Tobacco Use    Smoking status: Former     Packs/day: 0.50     Years: 4.00     Additional pack years: 0.00     Total pack years: 2.00     Types: Cigarettes     Start date: 2016    Smokeless tobacco: Never   Vaping Use    Vaping Use: Every day    Substances: Nicotine, THC, Flavoring, a few times throughout the day    Devices: Disposable   Substance and Sexual Activity    Alcohol use: Not Currently    Drug use: Not Currently     Types: Marijuana    Sexual activity: Defer     Family History   Problem Relation Age of Onset    Suicide Attempts Mother     Self-Injurious Behavior  Mother     Anxiety disorder Mother     Depression Mother     Bipolar disorder Mother     Self-Injurious Behavior  Sister     Depression Sister     Anxiety disorder Sister     Bipolar disorder Sister     Stroke Maternal Grandfather     COPD Maternal Grandmother     Anxiety disorder Sister     Bipolar disorder Sister     Depression Sister     Self-Injurious Behavior  Sister      Social History:  Martial Status: In a relationship, 5 years, healthy  Employed: CNA  Kids: 1  son, age 2  House: Lives with boyfriend  Legal:Denies   History: Denies  Family History:   Suicide Attempts: Denies  Suicide Completions: Denies  Developmental History:   Born: [place]  Siblings: Several, 5 sisters, 3 brothers  High School: Graduate  College: Medical billing and coding      PLAN:   Presentation seems most consistent with DSM-V criteria for:  Diagnoses and all orders for this visit:    1. Moderate episode of recurrent major depressive disorder (Primary)  -     venlafaxine XR (EFFEXOR-XR) 75 MG 24 hr capsule; Take 1 capsule by mouth Daily.  Dispense: 30 capsule; Refill: 1    2. Attention deficit hyperactivity disorder (ADHD), predominantly hyperactive type    3. Generalized anxiety disorder  -     venlafaxine XR (EFFEXOR-XR) 75 MG 24 hr capsule; Take 1 capsule by mouth Daily.  Dispense: 30 capsule; Refill: 1    4. Medication management  -     POC Urine Drug Screen [facility] Bio-Cup           Continue effexor XR 75 mg daily  Start Azstarys 26.1 mg daily  UDS  CSA  Follow-up 1 month  Discussed medication options and treatment plan of prescribed medication as well as the risks, benefits, and side effects.  Patient verbalized understanding and is agreeable to this plan.   Patient is agreeable to call the office with any worsening of symptoms or onset of side effects.   Patient is agreeable to call 911 or go to the nearest ER should he/she begin having SI/HI.   Addressed all questions and concerns.    Continue psychotherapeutic modalities as indicated.    TREATMENT PLAN/GOALS:   Treatment plan: Continue supportive psychotherapy efforts and medications as indicated. Will continue to challenge patterns of living conducive to pathology, strengthen defenses, promote problems solving, restore adaptive functioning and provide symptom relief. Treatment and medication options discussed during today's visit. Patient acknowledged and verbally consented to continue with current treatment plan and was educated on the  importance of compliance with treatment and follow-up appointments.  Functional status:Good  Prognosis: Good  Progress: Will address progress and continued improvement at follow-up visits.    Safety: No acute safety concerns.   Therapy: Will continue therapy at future visits.  Risk Assessment: Risk of self-harm acutely and chronically is moderate.  Risk factors include anxiety disorder, mood disorder, and recent psychosocial stressors. Protective factors include no family history, denies access to guns/weapons, no present SI, minimal AODA, healthcare seeking, future orientation, willingness to engage in care.  Risk assessment could be further elevated in the event of treatment noncompliance and/or AODA.  Labs/studies: No labs/studies ordered at this time  Medications:   New Medications Ordered This Visit   Medications    venlafaxine XR (EFFEXOR-XR) 75 MG 24 hr capsule     Sig: Take 1 capsule by mouth Daily.     Dispense:  30 capsule     Refill:  1        Medication Education:   EFFEXOR (VENLAFAXINE) Risks, benefits, alternatives discussed with patient including anorexia, constipation, dizziness, dry mouth, nausea, nervousness, somnolence, sweating, sexual side effects, headache and insomnia. After discussion of these risks and benefits, the patient voiced understanding and agreed to proceed.   AZSTARYS (serdexmethylphenidate/dexmethylphenidate) the most common side effects include decreased appetite, nausea, indigestion, weight loss, dizziness, mood swings, increased blood pressure, trouble sleeping, vomiting, stomach pain, anxiety, irritability, increased heart rate.  This medication is a controlled substance because it can be abused early to dependence.  Keep it in a safe place to prevent misuse and abuse.  Please call your health care provider right away if there are any new or worsening mental symptoms or problems during treatment.    GABY reviewed as expected.  Follow-up: Return in about 1 month (around  12/7/2023) for Next scheduled follow up.       This document has been electronically signed by VASU Dennison  November 7, 2023 19:45 EST    Please note that portions of this note were completed with a voice recognition program.  Copied text in this note has been reviewed and is accurate as of 11/07/23

## 2023-11-07 NOTE — TELEPHONE ENCOUNTER
Caller: Hina Tucker    Relationship: Self    Best call back number: [phone]     What medication are you requesting: AZSTARYS    If a prescription is needed, what is your preferred pharmacy and phone number: [facility] [id number] - [place], [place] - [address] [place] [address] - [phone]  - [phone] FX     Additional notes: PATIENT STATES THAT THE ADHD MEDICATION WAS DISCUSSED DURING APPOINTMENT BUT NOT SENT TO PHARMACY. PLEASE CALL PATIENT ONCE SENT TO PHARMACY.

## 2023-11-07 NOTE — PATIENT INSTRUCTIONS
1.  Please return to clinic at your next scheduled visit.  Please contact the clinic ([phone]) at least 24 hours prior in the event you need to cancel.  2.  Do no harm to yourself or others.    3.  Avoid alcohol and drugs.    4.  Take all medications as prescribed.  Please contact the clinic with any concerns. If you are in need of medication refills, please call the clinic at [phone].    5. Should you want to get in touch with your provider, VASU Dennison, please contact the office ([phone]), and staff will be able to page Abi directly.  6. In the event you have personal crisis, contact the following crisis numbers: Suicide Prevention Hotline 1-875.581.6508; MARK Helpline 4-474-011-YDHQ; [facility] [phone]; text HELLO to 875626; or 970.     SPECIFIC RECOMMENDATIONS:     1.      Medications discussed at this encounter:     New Medications Ordered This Visit   Medications    venlafaxine XR (EFFEXOR-XR) 75 MG 24 hr capsule     Sig: Take 1 capsule by mouth Daily.     Dispense:  30 capsule     Refill:  1                       2.      Psychotherapy recommendations: We will continue therapy at future visits.     3.     Return to clinic: Return in about 1 month (around 12/7/2023) for Next scheduled follow up.

## 2023-11-08 NOTE — TELEPHONE ENCOUNTER
venlafaxine XR (EFFEXOR-XR) 75 MG 24 hr capsule (11/07/2023)     Serdexmethylphen-Dexmethylphen (azSTARys) 26.1-5.2 MG capsule (11/07/2023)     Called pt back to notify medications sent to [facility] in [place]. Pt was advised to contact pharmacy to see if orders are available to be filled, and if she has any additional questions/concerns to call our office. Pt expressed understanding, all questions answered at this time.

## 2023-12-06 DIAGNOSIS — F90.1 ATTENTION DEFICIT HYPERACTIVITY DISORDER (ADHD), PREDOMINANTLY HYPERACTIVE TYPE: ICD-10-CM

## 2023-12-06 RX ORDER — SERDEXMETHYLPHENIDATE AND DEXMETHYLPHENIDATE 5.2; 26.1 MG/1; MG/1
26.1 CAPSULE ORAL DAILY
Qty: 30 CAPSULE | Refills: 0 | Status: SHIPPED | OUTPATIENT
Start: 2023-12-06

## 2023-12-06 NOTE — TELEPHONE ENCOUNTER
CONTROLLED MEDICATION REFILL REQUEST    STATE REGULATION APPT EVERY 3 MONTHS     UDS(URINE DRUG SCREEN) EVERY 6 MONTHS OR UP TO PROVIDER PREFERENCE      NEW NARC CONSENT EVERY YEAR      MEDICATION: Serdexmethylphen-Dexmethylphen (azSTARys) 26.1-5.2 MG capsule (11/07/2023)     PT(PATIENT) CONFIRMED PHARMACY: [facility] [id number] [place], [place]      NEXT OFFICE VISIT: Appointment with Abi Mariee APRN (12/12/2023)     LAST OFFICE VISIT: Office Visit with Abi Mariee APRN (11/07/2023)     NARC CONSENT: CONTROLLED SUBSTANCE AGREEMENT - SCAN - CSA, [facility], 11/07/2023 (11/07/2023)     URINE DRUG SCREEN(STANDING ORDER): POC Urine Drug Screen [facility]-Cup (11/07/2023 09:42)     PROVIDER PLEASE ADVISE

## 2023-12-12 ENCOUNTER — OFFICE VISIT (OUTPATIENT)
Dept: BEHAVIORAL HEALTH | Facility: CLINIC | Age: 23
End: 2023-12-12
Payer: MEDICAID

## 2023-12-12 VITALS
WEIGHT: 162.44 LBS | HEART RATE: 87 BPM | SYSTOLIC BLOOD PRESSURE: 110 MMHG | DIASTOLIC BLOOD PRESSURE: 68 MMHG | OXYGEN SATURATION: 100 % | BODY MASS INDEX: 29.89 KG/M2 | HEIGHT: 62 IN

## 2023-12-12 DIAGNOSIS — F33.1 MODERATE EPISODE OF RECURRENT MAJOR DEPRESSIVE DISORDER: ICD-10-CM

## 2023-12-12 DIAGNOSIS — F41.1 GENERALIZED ANXIETY DISORDER: ICD-10-CM

## 2023-12-12 DIAGNOSIS — F90.1 ATTENTION DEFICIT HYPERACTIVITY DISORDER (ADHD), PREDOMINANTLY HYPERACTIVE TYPE: Primary | ICD-10-CM

## 2023-12-12 RX ORDER — SERDEXMETHYLPHENIDATE AND DEXMETHYLPHENIDATE 7.8; 39.2 MG/1; MG/1
39.2 CAPSULE ORAL DAILY
Qty: 30 CAPSULE | Refills: 0 | Status: SHIPPED | OUTPATIENT
Start: 2023-12-12

## 2023-12-12 NOTE — PROGRESS NOTES
"[facility]  Medication Management Follow-up    Vital Signs:   /68   Pulse 87   Ht 157.5 cm (62\")   Wt 73.7 kg (162 lb 7 oz)   SpO2 100%   BMI 29.71 kg/m²     Chief Complaint:     History of Present Illness:   Hina Tucker is a 23 y.o. female who presents today for follow-up and medication management for:    ICD-10-CM ICD-9-CM   1. Attention deficit hyperactivity disorder (ADHD), predominantly hyperactive type  F90.1 314.01   2. Moderate episode of recurrent major depressive disorder  F33.1 296.32   3. Generalized anxiety disorder  F41.1 300.02       12/12/2023 Patient is taking medications as prescribed and is tolerating them well.   Azstarys is helping with her fidgeting, but brain still feels like a \"scrambled egg\"   She is going to be starting a new job at [employer]. Anxiety symptoms have improved.   Depression  Patient presents with the following symptoms: anhedonia, decreased concentration, depressed mood, excessive worry, fatigue, feelings of hopelessness, feelings of worthlessness, insomnia, irritability, memory impairment, nervousness/anxiety, psychomotor agitation and restlessness.  Patient is not experiencing: suicidal ideas, suicidal planning and thoughts of death.  Frequency of symptoms: most days   Severity: causing significant distress   Sleep quality: poor  Denies suicidal ideation.  Denies AVH.  We will continue to monitor for mood, behavior, and safety.    Record Review is below for 12/12/2023 :   No current or pertinent labs  EKG Results:  None in record  Head Imaging:  None in record      11/07/2023 Stopped taking her ADHD medication at age 17 because she didn't think she would need it anymore. \"I feel like I can't function in any area of my life\" Struggles with impulsivity, puts her foot in her mouth a lot, she has lost several jobs, trouble with grocery shopping. She is going to therapy weekly.   Depression  Visit Type: initial  Onset of symptoms: more than 5 " "years ago  Progression since onset: gradually worsening  Patient presents with the following symptoms: anhedonia, decreased concentration, depressed mood, excessive worry, fatigue, feelings of hopelessness, feelings of worthlessness, insomnia, irritability, memory impairment, nervousness/anxiety, psychomotor agitation and restlessness.  Patient is not experiencing: suicidal ideas, suicidal planning and thoughts of death.  Frequency of symptoms: most days   Severity: causing significant distress   Sleep quality: poor  Denies suicidal ideation. Denies AVH.     ADHD:  Symptoms are persistent and significantly interfere with major life activities and/or result in significant suffering  With focus on K5 through 6th grade only   Grades: Poor  Behavioral issues: \"I have gotten in trouble my whole life\"  Special Classes:Denies  Inattention:Yes  Referral for ADHD testing: Diagnosed age 6, Adderall     Often fails to give close attention to details or makes careless mistakes in schoolwork, at work, or during other activities:Yes  Often has difficulty sustaining attention in tasks:Yes  Often does not seem to listen when spoken to directly:Yes  Often does not follow through on instructions and fails to finish duties in the workplace:Yes  Often has difficulty organizing tasks and activities:Yes  Often avoids, dislikes or is reluctant to engage in tasks that require sustained mental effort:Yes  Often loses things necessary for tasks or activities:Yes  Is often easily distracted by extraneous stimuli: Yes  Is often forgetful in daily activities: Yes  Hyperactivity and Impulsivity: Yes  Often fidgets with or taps hands or feet: Yes  Often leaves seat in situations when remaining seated is expected: Yes  Often feels restless: Yes  Is often “on the go”, acting as if “driven by a motor”: Yes  Often talks excessively: Yes  Often blurts out an answer before a question has been completed: Yes  Often has difficulty waiting their turn: " Yes  Often interrupts or intrudes on others: Yes  Continue effexor XR 75 mg daily  Start Azstarys 26.1 mg daily  UDS  CSA  Follow-up 1 month    Record Review for 11/07/2023 : I have thoroughly reviewed the patient's electronic medical record to include previous encounters, care everywhere, notes, medications, labs, GABY and UDS (if applicable), imaging, and EKG's.  Pertinent information is included in this note.  No current or pertinent labs  EKG Results:  None in record  Head Imaging:  None in record    Per Referring Provider 1/17/2023  ADHD : Patient presents for follow-up regarding ADHD.  Recent urine drug screen significant for THC and benzodiazepine.  Discussed during a prior visit here in office.  Patient has been advised referral to psychiatry secondary to complaints regarding anxiety.  She admits to one visit with psychiatry and is now follow-up in March.  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     Past Psychiatric History:  Began Treatment: Age 6  Diagnoses: ADHD, anxiety, ODD,   Psychiatrist: Yes  Therapist: [facility]  Admission History: Several admissions as a child from age [age]  Medication Trials: Adderall, lexapro, zoloft, abilify, seroquel, guanfacine, hydroxyzine, buspar, effexor XR, atomoxetine  Suicide Attempts: Denies  Self Harm: Hx of cutting, 7 years since last incident, superficial scarring joseluis arms, upper thighs  Substance use/abuse:Used to smoke marijuana, she has quit, she thought it was helping her symptoms   Withdrawal Symptoms: Not applicable  Longest Period Sober: Not applicable  AA: Not applicable  Trauma/Childhood/ACE:  Mom suffered with Alcohol use disorder  Access to Firearms: Denies    MENTAL STATUS EXAM   General Appearance:  Cleanly groomed and dressed and well developed  Eye Contact:  Good eye contact  Attitude:  Cooperative and polite  Motor Activity:  Normal gait, posture  Speech:  Normal rate, tone, volume  Mood and affect:  Normal, pleasant and  euthymic  Hopelessness:  Denies  Thought Process:  Logical and goal-directed  Associations/ Thought Content:  No delusions  Hallucinations:  None  Suicidal Ideations:  Not present  Homicidal Ideation:  Not present  Sensorium:  Alert  Orientation:  Person, place, time and situation  Immediate Recall, Recent, and Remote Memory:  Intact  Attention Span/ Concentration:  Good  Fund of Knowledge:  Appropriate for age and educational level  Intellectual Functioning:  Average range  Insight:  Good  Judgement:  Good  Reliability:  Good  Impulse Control:  Good          Review of systems is negative except as noted in HPI.  Labs:  WBC   Date Value Ref Range Status   10/12/2021 2.65 (L) 3.40 - 10.80 10*3/mm3 Final     Platelets   Date Value Ref Range Status   10/12/2021 267 140 - 450 10*3/mm3 Final     Hemoglobin   Date Value Ref Range Status   10/12/2021 11.0 (L) 12.0 - 15.9 g/dL Final     Hematocrit   Date Value Ref Range Status   10/12/2021 34.4 34.0 - 46.6 % Final     Glucose   Date Value Ref Range Status   10/12/2021 77 65 - 99 mg/dL Final     Creatinine   Date Value Ref Range Status   10/12/2021 0.61 0.57 - 1.00 mg/dL Final     ALT (SGPT)   Date Value Ref Range Status   10/12/2021 11 1 - 33 U/L Final     AST (SGOT)   Date Value Ref Range Status   10/12/2021 16 1 - 32 U/L Final     BUN   Date Value Ref Range Status   10/12/2021 9 6 - 20 mg/dL Final     Total Cholesterol   Date Value Ref Range Status   10/12/2021 137 0 - 200 mg/dL Final     Triglycerides   Date Value Ref Range Status   10/12/2021 54 0 - 150 mg/dL Final     HDL Cholesterol   Date Value Ref Range Status   10/12/2021 44 40 - 60 mg/dL Final     LDL Cholesterol    Date Value Ref Range Status   10/12/2021 81 0 - 100 mg/dL Final     VLDL Cholesterol   Date Value Ref Range Status   10/12/2021 12 5 - 40 mg/dL Final     LDL/HDL Ratio   Date Value Ref Range Status   10/12/2021 1.87  Final     Hemoglobin A1C   Date Value Ref Range Status   09/13/2022 5.30 4.80 - 5.60 %  Final     TSH   Date Value Ref Range Status   10/12/2021 0.768 0.270 - 4.200 uIU/mL Final      Pain Management Panel  More data may exist         Latest Ref Rng & Units 11/7/2023 10/12/2022   Pain Management Panel   Amphetamine, Urine Qual Negative Negative  -   Barbiturates Screen, Urine Negative Negative  Negative    Benzodiazepine Screen, Urine Negative Negative  Positive    Buprenorphine, Screen, Urine Negative Negative  -   Cocaine Screen, Urine Negative Negative  Negative    Methadone Screen , Urine Negative Negative  Negative    Methamphetamine, Ur Negative Negative  -      Imaging Results:  No Images in the past 120 days found..  Current Medications:   Current Outpatient Medications   Medication Sig Dispense Refill    venlafaxine XR (EFFEXOR-XR) 75 MG 24 hr capsule Take 1 capsule by mouth Daily. 30 capsule 1    Serdexmethylphen-Dexmethylphen (azSTARys) 39.2-7.8 MG capsule Take 39.2 mg by mouth Daily. 30 capsule 0     No current facility-administered medications for this visit.     Problem List:  Patient Active Problem List   Diagnosis    Anxiety and depression    Attention deficit hyperactivity disorder (ADHD), predominantly inattentive type     Allergy:   Allergies   Allergen Reactions    Amoxicillin Anaphylaxis    Penicillin G Anaphylaxis      Discontinued Medications:  Medications Discontinued During This Encounter   Medication Reason    norelgestromin-ethinyl estradiol (ORTHO EVRA) 150-35 MCG/24HR *Therapy completed    Serdexmethylphen-Dexmethylphen (azSTARys) 26.1-5.2 MG capsule Dose adjustment         PLAN:   Presentation seems most consistent with DSM-V criteria for:  Diagnoses and all orders for this visit:    1. Attention deficit hyperactivity disorder (ADHD), predominantly hyperactive type (Primary)  -     Serdexmethylphen-Dexmethylphen (azSTARys) 39.2-7.8 MG capsule; Take 39.2 mg by mouth Daily.  Dispense: 30 capsule; Refill: 0    2. Moderate episode of recurrent major depressive disorder    3.  Generalized anxiety disorder       Continue effexor XR 75 mg daily  Increase Azstarys 39.2 mg daily  Follow-up 1 month  Medication Education:   EFFEXOR (VENLAFAXINE) Risks, benefits, alternatives discussed with patient including anorexia, constipation, dizziness, dry mouth, nausea, nervousness, somnolence, sweating, sexual side effects, headache and insomnia. After discussion of these risks and benefits, the patient voiced understanding and agreed to proceed.   AZSTARYS (serdexmethylphenidate/dexmethylphenidate) the most common side effects include decreased appetite, nausea, indigestion, weight loss, dizziness, mood swings, increased blood pressure, trouble sleeping, vomiting, stomach pain, anxiety, irritability, increased heart rate.  This medication is a controlled substance because it can be abused early to dependence.  Keep it in a safe place to prevent misuse and abuse.  Please call your health care provider right away if there are any new or worsening mental symptoms or problems during treatment.  Medications:   New Medications Ordered This Visit   Medications    Serdexmethylphen-Dexmethylphen (azSTARys) 39.2-7.8 MG capsule     Sig: Take 39.2 mg by mouth Daily.     Dispense:  30 capsule     Refill:  0     Increase dose. Patient will return unused portion of Azstarys in exchange for increased dose. Thx 4 all u Do!      GABY reviewed.   Discussed medication options and treatment plan of prescribed medication as well as the risks, benefits, and side effects.  Patient verbalized understanding and is agreeable to this plan.   Patient is agreeable to call the office with any worsening of symptoms or onset of side effects.   Patient is agreeable to call 911 or go to the nearest ER should he/she begin having SI/HI.   Continue psychotherapeutic modalities as indicated.  Continue to challenge patterns of living conducive to pathology, strengthen defenses, promote problems solving, restore adaptive functioning and  provide symptom relief.   Patient acknowledged and verbally consented to continue with current treatment plan and was educated on the importance of compliance with treatment and follow-up appointments.  Addressed all questions and concerns.     Psychotherapy:      40 minutes of supportive psychotherapy with goal to strengthen defenses, promote problems solving, restore adaptive functioning and provide symptom relief. The therapeutic alliance was strengthened to encourage the patient to express their thoughts and feelings. Esteem building was enhanced through praise, reassurance, normalizing and encouragement. Coping skills were enhanced to build distress tolerance skills and emotional regulation. Allowed patient to freely discuss issues without interruption or judgement with unconditional positive regard, active listening skills, and empathy. Provided a safe, confidential environment to facilitate the development of a positive therapeutic relationship and encourage open, honest communication. Assisted patient in identifying risk factors which would indicate the need for higher level of care including thoughts to harm self or others and/or self-harming behavior and encouraged patient to contact this office, call 911, or present to the nearest emergency room should any of these events occur. Assisted patient in processing session content; acknowledged and normalized patient’s thoughts, feelings, and concerns by utilizing a person-centered approach in efforts to build appropriate rapport and a positive therapeutic relationship with open and honest communication. Patient given education on medication side effects, diagnosis/illness and relapse symptoms. Plan to continue supportive psychotherapy in next appointment to provide symptom relief.      Functional status:Good  Prognosis: Good  Progress: Continued improvement    Safety/Risk Assessment: Risk of self-harm acutely and chronically is moderate.    Risk factors  include anxiety disorder, mood disorder, and recent psychosocial stressors.   Protective factors include no family history, denies access to guns/weapons, no present SI, no history of suicide attempts or self-harm in the past, minimal AODA, healthcare seeking, future orientation, willingness to engage in care.    Risk assessment could be further elevated in the event of treatment noncompliance and/or AODA.    Follow-up: Return in about 1 month (around 1/12/2024) for Next scheduled follow up.         This document has been electronically signed by VASU Dennison  December 26, 2023 17:23 EST    Please note that portions of this note were completed with a voice recognition program.  Copied text in this note has been reviewed and is accurate as of 12/26/23

## 2023-12-26 NOTE — PATIENT INSTRUCTIONS
1.  Please return to clinic at your next scheduled visit.  Please contact the clinic ([phone]) at least 24 hours prior in the event you need to cancel.  2.  Do no harm to yourself or others.    3.  Avoid alcohol and drugs.    4.  Take all medications as prescribed.  Please contact the clinic with any concerns. If you are in need of medication refills, please call the clinic at [phone].    5. Should you want to get in touch with your provider, VASU Dennison, please contact the office ([phone]), and staff will be able to page Abi directly.  6. In the event you have personal crisis, contact the following crisis numbers: Suicide Prevention Hotline 1-435.863.8289; MARK Helpline 5-613-929-VLKR; [facility] [phone]; text HELLO to 453679; or 952.     SPECIFIC RECOMMENDATIONS:     1.      Medications discussed at this encounter:     New Medications Ordered This Visit   Medications    Serdexmethylphen-Dexmethylphen (azSTARys) 39.2-7.8 MG capsule     Sig: Take 39.2 mg by mouth Daily.     Dispense:  30 capsule     Refill:  0     Increase dose. Patient will return unused portion of Azstarys in exchange for increased dose. Thx 4 all u Do!                       2.      Psychotherapy recommendations: We will continue therapy at future visits.     3.     Return to clinic: Return in about 1 month (around 1/12/2024) for Next scheduled follow up.

## 2024-01-03 DIAGNOSIS — F33.1 MODERATE EPISODE OF RECURRENT MAJOR DEPRESSIVE DISORDER: ICD-10-CM

## 2024-01-03 DIAGNOSIS — F41.1 GENERALIZED ANXIETY DISORDER: ICD-10-CM

## 2024-01-03 RX ORDER — VENLAFAXINE HYDROCHLORIDE 75 MG/1
75 CAPSULE, EXTENDED RELEASE ORAL DAILY
Qty: 30 CAPSULE | Refills: 1 | Status: SHIPPED | OUTPATIENT
Start: 2024-01-03

## 2024-01-11 DIAGNOSIS — F90.1 ATTENTION DEFICIT HYPERACTIVITY DISORDER (ADHD), PREDOMINANTLY HYPERACTIVE TYPE: ICD-10-CM

## 2024-01-11 NOTE — TELEPHONE ENCOUNTER
PT(PATIENT) VERIFIED      Serdexmethylphen-Dexmethylphen (azSTARys) 39.2-7.8 MG capsule (2023)   Dispense Quantity: 30 capsule Refills: 0    Note to Pharmacy: Increase dose. Patient will return unused portion of Azstarys in exchange for increased dose. Thx 4 all u Do!         Sig: Take 39.2 mg by mouth Daily     PT(PATIENT) STATES SHE THINKS SHE MAY NEED A DOSAGE INCREASE AGAIN    PT(PATIENT) ADVISED PREVIOUS SCRIPT HAS A NOTE TO INCREASE DOSAGE    PT(PATIENT) STATES SHE HAS NOTICED THAT IT DOESN'T SEEM TO LAST THRU OUT THE DAY, AND THAT AFTER ABOUT HALF THE DAY SHE STILL HAS TROUBLE FOCUSING AND GETTING TASKS COMPLETED     PT(PATIENT) REQUESTED FOR SCRIPT TO BE SENT TO [facility]     NEXT APPT WITH PROVIDER  Appointment with Abi Mariee APRN (2024)     PROVIDER PLEASE ADVISE

## 2024-01-11 NOTE — TELEPHONE ENCOUNTER
PT REQUESTING REFILL VIA VOICEMAIL MESSAGE.    PT HOWEVER DID NOT STATE WHICH MEDICATION.    AFTER REVIEW OF THE CHART PT IS DUE FOR A REFILL ON AZSTARYS 39.2-7.8.  Serdexmethylphen-Dexmethylphen (azSTARys) 39.2-7.8 MG capsule (12/12/2023)     I CALLED PT BACK. PT DID NOT ANSWER.  I LEFT A VOICEMAIL REQUESTING PT CALL OUR OFFICE BACK.     WE NEED  CONFIRMATION ON WHICH MEDICATION SHE IS IN NEED OF AND TO VERIFY PHARMACY IT NEEDS TO BE SENT TO.

## 2024-01-12 RX ORDER — SERDEXMETHYLPHENIDATE AND DEXMETHYLPHENIDATE 7.8; 39.2 MG/1; MG/1
39.2 CAPSULE ORAL DAILY
Qty: 14 CAPSULE | Refills: 0 | Status: SHIPPED | OUTPATIENT
Start: 2024-01-12

## 2024-01-12 NOTE — TELEPHONE ENCOUNTER
Please inform patient will send in 14 day supply of current dose and we will discuss a possible dosage increase at our next appointment in two weeks Appointment with Abi Mariee APRN (01/22/2024)

## 2024-01-12 NOTE — TELEPHONE ENCOUNTER
I CALLED AND SPOKE TO PT.    I RELAYED OF PROVIDERS MESSAGE VERBATIM.    PT EXPRESSED GRATITUDE AND UNDERSTANDING.    NOTHING FURTHER IS NEEDED AT THIS TIME.

## 2024-01-24 ENCOUNTER — OFFICE VISIT (OUTPATIENT)
Dept: BEHAVIORAL HEALTH | Facility: CLINIC | Age: 24
End: 2024-01-24
Payer: MEDICAID

## 2024-01-24 VITALS
BODY MASS INDEX: 31.1 KG/M2 | DIASTOLIC BLOOD PRESSURE: 65 MMHG | OXYGEN SATURATION: 99 % | SYSTOLIC BLOOD PRESSURE: 108 MMHG | HEART RATE: 102 BPM | WEIGHT: 169 LBS | HEIGHT: 62 IN

## 2024-01-24 DIAGNOSIS — F33.1 MODERATE EPISODE OF RECURRENT MAJOR DEPRESSIVE DISORDER: Primary | ICD-10-CM

## 2024-01-24 DIAGNOSIS — F41.1 GENERALIZED ANXIETY DISORDER: ICD-10-CM

## 2024-01-24 DIAGNOSIS — F90.1 ATTENTION DEFICIT HYPERACTIVITY DISORDER (ADHD), PREDOMINANTLY HYPERACTIVE TYPE: ICD-10-CM

## 2024-01-24 PROCEDURE — 90833 PSYTX W PT W E/M 30 MIN: CPT

## 2024-01-24 PROCEDURE — 99214 OFFICE O/P EST MOD 30 MIN: CPT

## 2024-01-24 PROCEDURE — 1160F RVW MEDS BY RX/DR IN RCRD: CPT

## 2024-01-24 PROCEDURE — 1159F MED LIST DOCD IN RCRD: CPT

## 2024-01-24 RX ORDER — MEDROXYPROGESTERONE ACETATE 150 MG/ML
150 INJECTION, SUSPENSION INTRAMUSCULAR
COMMUNITY
Start: 2024-01-15

## 2024-01-24 RX ORDER — SERDEXMETHYLPHENIDATE AND DEXMETHYLPHENIDATE 10.4; 52.3 MG/1; MG/1
1 CAPSULE ORAL DAILY
Qty: 30 CAPSULE | Refills: 0 | Status: SHIPPED | OUTPATIENT
Start: 2024-01-24

## 2024-01-24 RX ORDER — VENLAFAXINE HYDROCHLORIDE 75 MG/1
75 CAPSULE, EXTENDED RELEASE ORAL DAILY
Qty: 30 CAPSULE | Refills: 1 | Status: SHIPPED | OUTPATIENT
Start: 2024-01-24

## 2024-01-24 NOTE — PATIENT INSTRUCTIONS
1.  Please return to clinic at your next scheduled visit.  Please contact the clinic ([phone]) at least 24 hours prior in the event you need to cancel.  2.  Do no harm to yourself or others.    3.  Avoid alcohol and drugs.    4.  Take all medications as prescribed.  Please contact the clinic with any concerns. If you are in need of medication refills, please call the clinic at [phone].    5. Should you want to get in touch with your provider, VASU Dennison, please contact the office ([phone]), and staff will be able to page Abi directly.  6. In the event you have personal crisis, contact the following crisis numbers: Suicide Prevention Hotline 1-504.204.7497; MARK Helpline 1-826-431-HKXJ; [facility] [phone]; text HELLO to 871843; or 257.     SPECIFIC RECOMMENDATIONS:     1.      Medications discussed at this encounter:     New Medications Ordered This Visit   Medications    venlafaxine XR (EFFEXOR-XR) 75 MG 24 hr capsule     Sig: Take 1 capsule by mouth Daily.     Dispense:  30 capsule     Refill:  1    Serdexmethylphen-Dexmethylphen (azSTARys) 52.3-10.4 MG capsule     Sig: Take 1 capsule by mouth Daily.     Dispense:  30 capsule     Refill:  0                       2.      Psychotherapy recommendations: We will continue therapy at future visits.     3.     Return to clinic: Return in about 1 month (around 2/24/2024) for Next scheduled follow up.

## 2024-01-24 NOTE — PROGRESS NOTES
"[facility]  Medication Management Follow-up    Vital Signs:   /65   Pulse 102   Ht 157.5 cm (62\")   Wt 76.7 kg (169 lb)   SpO2 99%   BMI 30.91 kg/m²     Chief Complaint: Depression. Anxiety. ADHD.     History of Present Illness:   Hina Tucker is a 23 y.o. female who presents today for follow-up and medication management for:    ICD-10-CM ICD-9-CM   1. Moderate episode of recurrent major depressive disorder  F33.1 296.32   2. Generalized anxiety disorder  F41.1 300.02   3. Attention deficit hyperactivity disorder (ADHD), predominantly hyperactive type  F90.1 314.01       01/22/2024 Patient discontinued Effexor because she was afraid of interaction with her birth control method. We discussed that there have been no known interactions found with her antidepressant. She is going to restart medication because it was helping her. Azstarys is helping, she hasn't missed any work and she would normally have been terminated by now. Has made it full 3 months.    Depression  Patient presents with the following symptoms: anhedonia, decreased concentration, depressed mood, excessive worry, fatigue, feelings of hopelessness, feelings of worthlessness, insomnia, irritability, memory impairment, nervousness/anxiety, psychomotor agitation and restlessness.  Patient is not experiencing: suicidal ideas, suicidal planning and thoughts of death.  Frequency of symptoms: most days   Severity: causing significant distress   Sleep quality: poor  Denies suicidal ideation.  Denies AVH.  We will continue to monitor for mood, behavior, and safety.    Record Review is below for 01/22/2024 :   No current or pertinent labs  EKG Results:  None in record  Head Imaging:  None in record    12/12/2023 Patient is taking medications as prescribed and is tolerating them well.   Azstarys is helping with her fidgeting, but brain still feels like a \"scrambled egg\"   She is going to be starting a new job at [employer]. Anxiety " "symptoms have improved.   Depression  Patient presents with the following symptoms: anhedonia, decreased concentration, depressed mood, excessive worry, fatigue, feelings of hopelessness, feelings of worthlessness, insomnia, irritability, memory impairment, nervousness/anxiety, psychomotor agitation and restlessness.  Patient is not experiencing: suicidal ideas, suicidal planning and thoughts of death.  Frequency of symptoms: most days   Severity: causing significant distress   Sleep quality: poor  Denies suicidal ideation.  Denies AVH.  We will continue to monitor for mood, behavior, and safety.  Continue effexor XR 75 mg daily  Increase Azstarys 39.2 mg daily  Follow-up 1 month    Record Review is below for 12/12/2023 :   No current or pertinent labs  EKG Results:  None in record  Head Imaging:  None in record      11/07/2023 Stopped taking her ADHD medication at age 17 because she didn't think she would need it anymore. \"I feel like I can't function in any area of my life\" Struggles with impulsivity, puts her foot in her mouth a lot, she has lost several jobs, trouble with grocery shopping. She is going to therapy weekly.   Depression  Visit Type: initial  Onset of symptoms: more than 5 years ago  Progression since onset: gradually worsening  Patient presents with the following symptoms: anhedonia, decreased concentration, depressed mood, excessive worry, fatigue, feelings of hopelessness, feelings of worthlessness, insomnia, irritability, memory impairment, nervousness/anxiety, psychomotor agitation and restlessness.  Patient is not experiencing: suicidal ideas, suicidal planning and thoughts of death.  Frequency of symptoms: most days   Severity: causing significant distress   Sleep quality: poor  Denies suicidal ideation. Denies AVH.     ADHD:  Symptoms are persistent and significantly interfere with major life activities and/or result in significant suffering  With focus on K5 through 6th grade only   Grades: " "Poor  Behavioral issues: \"I have gotten in trouble my whole life\"  Special Classes:Denies  Inattention:Yes  Referral for ADHD testing: Diagnosed age 6, Adderall     Often fails to give close attention to details or makes careless mistakes in schoolwork, at work, or during other activities:Yes  Often has difficulty sustaining attention in tasks:Yes  Often does not seem to listen when spoken to directly:Yes  Often does not follow through on instructions and fails to finish duties in the workplace:Yes  Often has difficulty organizing tasks and activities:Yes  Often avoids, dislikes or is reluctant to engage in tasks that require sustained mental effort:Yes  Often loses things necessary for tasks or activities:Yes  Is often easily distracted by extraneous stimuli: Yes  Is often forgetful in daily activities: Yes  Hyperactivity and Impulsivity: Yes  Often fidgets with or taps hands or feet: Yes  Often leaves seat in situations when remaining seated is expected: Yes  Often feels restless: Yes  Is often “on the go”, acting as if “driven by a motor”: Yes  Often talks excessively: Yes  Often blurts out an answer before a question has been completed: Yes  Often has difficulty waiting their turn: Yes  Often interrupts or intrudes on others: Yes  Continue effexor XR 75 mg daily  Start Azstarys 26.1 mg daily  UDS  CSA  Follow-up 1 month    Record Review for 11/07/2023 : I have thoroughly reviewed the patient's electronic medical record to include previous encounters, care everywhere, notes, medications, labs, GABY and UDS (if applicable), imaging, and EKG's.  Pertinent information is included in this note.  No current or pertinent labs  EKG Results:  None in record  Head Imaging:  None in record    Per Referring Provider 1/17/2023  ADHD : Patient presents for follow-up regarding ADHD.  Recent urine drug screen significant for THC and benzodiazepine.  Discussed during a prior visit here in office.  Patient has been advised " referral to psychiatry secondary to complaints regarding anxiety.  She admits to one visit with psychiatry and is now follow-up in March.  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     Past Psychiatric History:  Began Treatment: Age 6  Diagnoses: ADHD, anxiety, ODD,   Psychiatrist: Yes  Therapist: [facility]  Admission History: Several admissions as a child from age [age]  Medication Trials: Adderall, lexapro, zoloft, abilify, seroquel, guanfacine, hydroxyzine, buspar, effexor XR, atomoxetine  Suicide Attempts: Denies  Self Harm: Hx of cutting, 7 years since last incident, superficial scarring joseluis arms, upper thighs  Substance use/abuse:Used to smoke marijuana, she has quit, she thought it was helping her symptoms   Withdrawal Symptoms: Not applicable  Longest Period Sober: Not applicable  AA: Not applicable  Trauma/Childhood/ACE:  Mom suffered with Alcohol use disorder  Access to Firearms: Denies    MENTAL STATUS EXAM   General Appearance:  Cleanly groomed and dressed and well developed  Eye Contact:  Good eye contact  Attitude:  Cooperative and polite  Motor Activity:  Normal gait, posture  Speech:  Normal rate, tone, volume  Mood and affect:  Normal, pleasant and euthymic  Hopelessness:  Denies  Thought Process:  Logical and goal-directed  Associations/ Thought Content:  No delusions  Hallucinations:  None  Suicidal Ideations:  Not present  Homicidal Ideation:  Not present  Sensorium:  Alert  Orientation:  Person, place, time and situation  Immediate Recall, Recent, and Remote Memory:  Intact  Attention Span/ Concentration:  Good  Fund of Knowledge:  Appropriate for age and educational level  Intellectual Functioning:  Average range  Insight:  Good  Judgement:  Good  Reliability:  Good  Impulse Control:  Good          Review of systems is negative except as noted in HPI.  Labs:  WBC   Date Value Ref Range Status   10/12/2021 2.65 (L) 3.40 - 10.80 10*3/mm3 Final     Platelets   Date Value Ref  Range Status   10/12/2021 267 140 - 450 10*3/mm3 Final     Hemoglobin   Date Value Ref Range Status   10/12/2021 11.0 (L) 12.0 - 15.9 g/dL Final     Hematocrit   Date Value Ref Range Status   10/12/2021 34.4 34.0 - 46.6 % Final     Glucose   Date Value Ref Range Status   10/12/2021 77 65 - 99 mg/dL Final     Creatinine   Date Value Ref Range Status   10/12/2021 0.61 0.57 - 1.00 mg/dL Final     ALT (SGPT)   Date Value Ref Range Status   10/12/2021 11 1 - 33 U/L Final     AST (SGOT)   Date Value Ref Range Status   10/12/2021 16 1 - 32 U/L Final     BUN   Date Value Ref Range Status   10/12/2021 9 6 - 20 mg/dL Final     Total Cholesterol   Date Value Ref Range Status   10/12/2021 137 0 - 200 mg/dL Final     Triglycerides   Date Value Ref Range Status   10/12/2021 54 0 - 150 mg/dL Final     HDL Cholesterol   Date Value Ref Range Status   10/12/2021 44 40 - 60 mg/dL Final     LDL Cholesterol    Date Value Ref Range Status   10/12/2021 81 0 - 100 mg/dL Final     VLDL Cholesterol   Date Value Ref Range Status   10/12/2021 12 5 - 40 mg/dL Final     LDL/HDL Ratio   Date Value Ref Range Status   10/12/2021 1.87  Final     Hemoglobin A1C   Date Value Ref Range Status   09/13/2022 5.30 4.80 - 5.60 % Final     TSH   Date Value Ref Range Status   10/12/2021 0.768 0.270 - 4.200 uIU/mL Final      Pain Management Panel  More data may exist         Latest Ref Rng & Units 11/7/2023 10/12/2022   Pain Management Panel   Amphetamine, Urine Qual Negative Negative  -   Barbiturates Screen, Urine Negative Negative  Negative    Benzodiazepine Screen, Urine Negative Negative  Positive    Buprenorphine, Screen, Urine Negative Negative  -   Cocaine Screen, Urine Negative Negative  Negative    Methadone Screen , Urine Negative Negative  Negative    Methamphetamine, Ur Negative Negative  -      Imaging Results:  No Images in the past 120 days found..  Current Medications:   Current Outpatient Medications   Medication Sig Dispense Refill     medroxyPROGESTERone (DEPO-PROVERA) 150 MG/ML injection Inject 1 mL into the appropriate muscle as directed by prescriber Every 3 (Three) Months.      venlafaxine XR (EFFEXOR-XR) 75 MG 24 hr capsule Take 1 capsule by mouth Daily. 30 capsule 1    Serdexmethylphen-Dexmethylphen (azSTARys) 52.3-10.4 MG capsule Take 1 capsule by mouth Daily. 30 capsule 0     No current facility-administered medications for this visit.     Problem List:  Patient Active Problem List   Diagnosis    Anxiety and depression    Attention deficit hyperactivity disorder (ADHD), predominantly inattentive type     Allergy:   Allergies   Allergen Reactions    Amoxicillin Anaphylaxis    Penicillin G Anaphylaxis      Discontinued Medications:  Medications Discontinued During This Encounter   Medication Reason    Serdexmethylphen-Dexmethylphen (azSTARys) 39.2-7.8 MG capsule Dose adjustment    venlafaxine XR (EFFEXOR-XR) 75 MG 24 hr capsule Reorder         PLAN:   Presentation seems most consistent with DSM-V criteria for:  Diagnoses and all orders for this visit:    1. Moderate episode of recurrent major depressive disorder (Primary)  -     venlafaxine XR (EFFEXOR-XR) 75 MG 24 hr capsule; Take 1 capsule by mouth Daily.  Dispense: 30 capsule; Refill: 1    2. Generalized anxiety disorder  -     venlafaxine XR (EFFEXOR-XR) 75 MG 24 hr capsule; Take 1 capsule by mouth Daily.  Dispense: 30 capsule; Refill: 1    3. Attention deficit hyperactivity disorder (ADHD), predominantly hyperactive type  -     Serdexmethylphen-Dexmethylphen (azSTARys) 52.3-10.4 MG capsule; Take 1 capsule by mouth Daily.  Dispense: 30 capsule; Refill: 0       Continue effexor XR 75 mg daily  Increase Azstarys 39.2 mg daily  Follow-up 1 month  Medication Education:   EFFEXOR (VENLAFAXINE) Risks, benefits, alternatives discussed with patient including anorexia, constipation, dizziness, dry mouth, nausea, nervousness, somnolence, sweating, sexual side effects, headache and insomnia. After  discussion of these risks and benefits, the patient voiced understanding and agreed to proceed.   AZSTARYS (serdexmethylphenidate/dexmethylphenidate) the most common side effects include decreased appetite, nausea, indigestion, weight loss, dizziness, mood swings, increased blood pressure, trouble sleeping, vomiting, stomach pain, anxiety, irritability, increased heart rate.  This medication is a controlled substance because it can be abused early to dependence.  Keep it in a safe place to prevent misuse and abuse.  Please call your health care provider right away if there are any new or worsening mental symptoms or problems during treatment.  Medications:   New Medications Ordered This Visit   Medications    venlafaxine XR (EFFEXOR-XR) 75 MG 24 hr capsule     Sig: Take 1 capsule by mouth Daily.     Dispense:  30 capsule     Refill:  1    Serdexmethylphen-Dexmethylphen (azSTARys) 52.3-10.4 MG capsule     Sig: Take 1 capsule by mouth Daily.     Dispense:  30 capsule     Refill:  0      GABY reviewed.   Discussed medication options and treatment plan of prescribed medication as well as the risks, benefits, and side effects.  Patient verbalized understanding and is agreeable to this plan.   Patient is agreeable to call the office with any worsening of symptoms or onset of side effects.   Patient is agreeable to call 911 or go to the nearest ER should he/she begin having SI/HI.   Continue psychotherapeutic modalities as indicated.  Continue to challenge patterns of living conducive to pathology, strengthen defenses, promote problems solving, restore adaptive functioning and provide symptom relief.   Patient acknowledged and verbally consented to continue with current treatment plan and was educated on the importance of compliance with treatment and follow-up appointments.  Addressed all questions and concerns.     Psychotherapy:      20 minutes of supportive psychotherapy with goal to strengthen defenses, promote  problems solving, restore adaptive functioning and provide symptom relief. The therapeutic alliance was strengthened to encourage the patient to express their thoughts and feelings. Esteem building was enhanced through praise, reassurance, normalizing and encouragement. Coping skills were enhanced to build distress tolerance skills and emotional regulation. Allowed patient to freely discuss issues without interruption or judgement with unconditional positive regard, active listening skills, and empathy. Provided a safe, confidential environment to facilitate the development of a positive therapeutic relationship and encourage open, honest communication. Assisted patient in identifying risk factors which would indicate the need for higher level of care including thoughts to harm self or others and/or self-harming behavior and encouraged patient to contact this office, call 911, or present to the nearest emergency room should any of these events occur. Assisted patient in processing session content; acknowledged and normalized patient’s thoughts, feelings, and concerns by utilizing a person-centered approach in efforts to build appropriate rapport and a positive therapeutic relationship with open and honest communication. Patient given education on medication side effects, diagnosis/illness and relapse symptoms. Plan to continue supportive psychotherapy in next appointment to provide symptom relief.      Functional status:Good  Prognosis: Good  Progress: Continued improvement    Safety/Risk Assessment: Risk of self-harm acutely and chronically is moderate.    Risk factors include anxiety disorder, mood disorder, and recent psychosocial stressors.   Protective factors include no family history, denies access to guns/weapons, no present SI, no history of suicide attempts or self-harm in the past, minimal AODA, healthcare seeking, future orientation, willingness to engage in care.    Risk assessment could be further  elevated in the event of treatment noncompliance and/or AODA.    Follow-up: Return in about 1 month (around 2/24/2024) for Next scheduled follow up.         This document has been electronically signed by VASU Dennison  January 30, 2024 08:13 EST    Please note that portions of this note were completed with a voice recognition program.  Copied text in this note has been reviewed and is accurate as of 01/30/24

## 2024-01-29 ENCOUNTER — TELEPHONE (OUTPATIENT)
Dept: PSYCHIATRY | Facility: CLINIC | Age: 24
End: 2024-01-29
Payer: MEDICAID

## 2024-01-29 NOTE — TELEPHONE ENCOUNTER
A PRIOR AUTHORIZATION HAS BEEN INITIATED, PROCESSED AND IS AWAITING SIGNED PROGRESS NOTE FROM PROVIDER TO SEND TO PLAN.  PLEASE REROUTE TO MA WHEN SIGNED

## 2024-01-30 NOTE — TELEPHONE ENCOUNTER
PA APPROVAL RECEIVED FOR PTS AZSTARYS FROM INSURANCE.    APPROVAL DATES ARE: 01/30/2024-02/29/2024.  BEHAVIORAL HEALTH - SCAN - [place] APPROVAL AZSTARYS; [place]; 01/30/2024. (01/30/2024)     PT VERBALLY INFORMED OF APPROVAL VIA TELEPHONE.

## 2024-02-23 NOTE — PROGRESS NOTES
"Chief Complaint   Patient presents with    Annual Exam       HPI:   23 y.o. . Presents for well woman exam. Contraception:  Depo-Provera  Menses:   occasional spotting since starting depo 2 months ago Pain:  Mild, OTC meds control discomfort  Last pap: none previous   Complaints: Pt has no complaints today.      Past Medical History:   Diagnosis Date    Anxiety and depression     Self-injurious behavior       History reviewed. No pertinent surgical history.   Family History   Problem Relation Age of Onset    Suicide Attempts Mother     Self-Injurious Behavior  Mother     Anxiety disorder Mother     Depression Mother     Bipolar disorder Mother     Cancer Mother         unknown type    Self-Injurious Behavior  Sister     Depression Sister     Anxiety disorder Sister     Bipolar disorder Sister     Anxiety disorder Sister     Bipolar disorder Sister     Depression Sister     Self-Injurious Behavior  Sister     COPD Maternal Grandmother     Stroke Maternal Grandfather     Breast cancer Neg Hx     Uterine cancer Neg Hx     Prostate cancer Neg Hx     Colon cancer Neg Hx      Allergies as of 2024 - Reviewed 2024   Allergen Reaction Noted    Amoxicillin Anaphylaxis     Penicillin g Anaphylaxis         PCP: does manage PMHx and preventative labs    /72   Pulse 97   Ht 157.5 cm (62\")   Wt 76.2 kg (168 lb)   LMP  (LMP Unknown) Comment: light spotting on depo no periods  Breastfeeding No   BMI 30.73 kg/m²     PHYSICAL EXAM: Chaperone present   General- NAD, alert and oriented, appropriate  Psych- Normal mood, good memory  Neck- No masses, no thyroid enlargement  Lymphatic- No palpable neck, axillary, or groin nodes  CV- Regular rhythm, no murmurs  Resp- CTA to bases, no wheezes  Abdomen- Soft, non distended, non tender, no masses  Breast left-  Bilaterally symmetrical, no masses, non tender, no nipple discharge  Breast right- Bilaterally symmetrical, no masses, non tender, no nipple " discharge  External genitalia- Normal female, no lesions  Urethra/meatus- Normal, no masses, non tender, no prolapse  Bladder- Normal, no masses, non tender, no prolapse  Vagina- Normal, no atrophy, no lesions, moderate amount of menstrual blood in vault, no prolapse  Cvx- Normal, no lesions, no discharge, No cervical motion tenderness  Uterus- Normal size, shape & consistency.  Non tender, mobile.  Adnexa- No mass, non tender  Anus/Rectum/Perineum- Not performed  Ext- No edema, no cyanosis    Skin- No lesions, no rashes, no acanthosis nigricans    ASSESSMENT and PLAN:    Diagnoses and all orders for this visit:    1. Well woman exam (Primary)  -     IGP,rfx Aptima HPV All Pth    Preventative:   BREAST HEALTH- Monthly self breast exam importance and how to reviewed. MMG and/or MRI (prn) reviewed per society guidelines and her individual history. Screening Imaging: Not medically needed  CERVICAL CANCER Screening- Reviewed current ASCCP guidelines for screening w and wo cotest HPV, age specific.  Screen: Updated today  COLON CANCER Screening- Reviewed current medical society guidelines and options.  Screen:  Not medically needed  SEXUAL HEALTH: STD screening recommended.  She declines.  She understands risks of STDs NLT infection (herself and current/future partners), infertility, chronic pain, potential future surgery/complications,  Safe sex and condoms  BONE HEALTH- Reviewed current medical society guidelines and options for testing, calcium and vit D intake.  Weight bearing exercise.  DEXA: Not medically needed  VACCINATIONS Recommended: COVID and booster PRN, Flu annually  Importance discussed, risk being unvaccinated reviewed.  Questions answered  Genetic testing: Does not qualify.  Smoking status- NON SMOKER  Follow up PCP/Specialist PMHx and Labs  TRACK MENSES, RTO if <q21d, >7d long, heavy or painful.    SAFE SEX/condoms importance reviewed.      Follow Up:  Return in about 1 year (around  2/27/2025).        Candy Brown, APRN  02/27/2024

## 2024-02-27 ENCOUNTER — OFFICE VISIT (OUTPATIENT)
Dept: OBSTETRICS AND GYNECOLOGY | Facility: CLINIC | Age: 24
End: 2024-02-27
Payer: MEDICAID

## 2024-02-27 VITALS
BODY MASS INDEX: 30.91 KG/M2 | SYSTOLIC BLOOD PRESSURE: 105 MMHG | HEART RATE: 97 BPM | DIASTOLIC BLOOD PRESSURE: 72 MMHG | WEIGHT: 168 LBS | HEIGHT: 62 IN

## 2024-02-27 DIAGNOSIS — Z01.419 WELL WOMAN EXAM: Primary | ICD-10-CM

## 2024-02-27 DIAGNOSIS — F90.1 ATTENTION DEFICIT HYPERACTIVITY DISORDER (ADHD), PREDOMINANTLY HYPERACTIVE TYPE: ICD-10-CM

## 2024-02-27 PROCEDURE — G0123 SCREEN CERV/VAG THIN LAYER: HCPCS | Performed by: NURSE PRACTITIONER

## 2024-02-27 RX ORDER — SERDEXMETHYLPHENIDATE AND DEXMETHYLPHENIDATE 10.4; 52.3 MG/1; MG/1
1 CAPSULE ORAL DAILY
Qty: 30 CAPSULE | Refills: 0 | Status: CANCELLED | OUTPATIENT
Start: 2024-02-27

## 2024-02-27 RX ORDER — CHOLECALCIFEROL (VITAMIN D3) 1250 MCG
1 CAPSULE ORAL WEEKLY
COMMUNITY
Start: 2024-02-23

## 2024-02-28 ENCOUNTER — OFFICE VISIT (OUTPATIENT)
Dept: BEHAVIORAL HEALTH | Facility: CLINIC | Age: 24
End: 2024-02-28
Payer: MEDICAID

## 2024-02-28 ENCOUNTER — PATIENT ROUNDING (BHMG ONLY) (OUTPATIENT)
Dept: OBSTETRICS AND GYNECOLOGY | Facility: CLINIC | Age: 24
End: 2024-02-28
Payer: MEDICAID

## 2024-02-28 VITALS
OXYGEN SATURATION: 99 % | BODY MASS INDEX: 30.91 KG/M2 | SYSTOLIC BLOOD PRESSURE: 118 MMHG | DIASTOLIC BLOOD PRESSURE: 80 MMHG | HEIGHT: 62 IN | HEART RATE: 117 BPM | WEIGHT: 168 LBS

## 2024-02-28 DIAGNOSIS — F41.1 GENERALIZED ANXIETY DISORDER: ICD-10-CM

## 2024-02-28 DIAGNOSIS — F90.1 ATTENTION DEFICIT HYPERACTIVITY DISORDER (ADHD), PREDOMINANTLY HYPERACTIVE TYPE: Primary | ICD-10-CM

## 2024-02-28 DIAGNOSIS — F33.1 MODERATE EPISODE OF RECURRENT MAJOR DEPRESSIVE DISORDER: ICD-10-CM

## 2024-02-28 DIAGNOSIS — F90.1 ATTENTION DEFICIT HYPERACTIVITY DISORDER (ADHD), PREDOMINANTLY HYPERACTIVE TYPE: ICD-10-CM

## 2024-02-28 RX ORDER — VENLAFAXINE HYDROCHLORIDE 75 MG/1
75 CAPSULE, EXTENDED RELEASE ORAL DAILY
Qty: 90 CAPSULE | Refills: 1 | Status: SHIPPED | OUTPATIENT
Start: 2024-02-28

## 2024-02-28 RX ORDER — SERDEXMETHYLPHENIDATE AND DEXMETHYLPHENIDATE 10.4; 52.3 MG/1; MG/1
1 CAPSULE ORAL DAILY
Qty: 30 CAPSULE | Refills: 0 | Status: SHIPPED | OUTPATIENT
Start: 2024-02-28 | End: 2024-02-28 | Stop reason: SDUPTHER

## 2024-02-28 RX ORDER — SERDEXMETHYLPHENIDATE AND DEXMETHYLPHENIDATE 10.4; 52.3 MG/1; MG/1
1 CAPSULE ORAL DAILY
Qty: 30 CAPSULE | Refills: 0 | Status: SHIPPED | OUTPATIENT
Start: 2024-02-28

## 2024-02-28 NOTE — TELEPHONE ENCOUNTER
Serdexmethylphen-Dexmethylphen (azSTARys) 52.3-10.4 MG capsule   Last ordered: Today (2/28/2024) by VASU Dennison     DUPLICATE ORDER PLEASE REVIEW

## 2024-02-28 NOTE — PROGRESS NOTES
A My-Chart message has been sent to the patient for PATIENT ROUNDING with [facility].

## 2024-02-28 NOTE — PROGRESS NOTES
"[facility]  Medication Management Follow-up    Vital Signs:   /80   Pulse 117   Ht 157.5 cm (62\")   Wt 76.2 kg (168 lb)   SpO2 99%   BMI 30.73 kg/m²     Chief Complaint: ADHD. Depression. Anxiety.     History of Present Illness:   Hina Tucker is a 23 y.o. female who presents today for follow-up and medication management for:    ICD-10-CM ICD-9-CM   1. Attention deficit hyperactivity disorder (ADHD), predominantly hyperactive type  F90.1 314.01   2. Moderate episode of recurrent major depressive disorder  F33.1 296.32   3. Generalized anxiety disorder  F41.1 300.02       02/28/2024 Patient is taking medications as prescribed and is tolerating them well.   \"I am doing really good, I have seen massive improvements in my life\" She is expressing extreme gratitude for the medications and improvement. She has also been able to maintain her job for the first time in a long while.   She is expressing some concern for possible insurance coverage, she is going to check with her HR department about her options.   Depression, anxiety, and ADHD are well-controlled with current medications.   Patient presents with gradual improvement of the following symptoms: anhedonia, decreased concentration, depressed mood, excessive worry, fatigue, feelings of hopelessness, feelings of worthlessness, insomnia, irritability, memory impairment, nervousness/anxiety, psychomotor agitation and restlessness.  Patient is not experiencing: suicidal ideas, suicidal planning and thoughts of death.  Frequency of symptoms: most days   Severity: causing significant distress   Sleep quality: improved, fair  Denies suicidal ideation.  Denies AVH.  We will continue to monitor for mood, behavior, and safety.    Record Review is below for 02/28/2024 :   No current or pertinent labs  EKG Results:  None in record  Head Imaging:  None in record    01/22/2024 Patient discontinued Effexor because she was afraid of interaction " "with her birth control method. We discussed that there have been no known interactions found with her antidepressant. She is going to restart medication because it was helping her. [employer] is helping, she hasn't missed any work and she would normally have been terminated by now. Has made it full 3 months.    Depression  Patient presents with the following symptoms: anhedonia, decreased concentration, depressed mood, excessive worry, fatigue, feelings of hopelessness, feelings of worthlessness, insomnia, irritability, memory impairment, nervousness/anxiety, psychomotor agitation and restlessness.  Patient is not experiencing: suicidal ideas, suicidal planning and thoughts of death.  Frequency of symptoms: most days   Severity: causing significant distress   Sleep quality: poor  Denies suicidal ideation.  Denies AVH.  We will continue to monitor for mood, behavior, and safety.  Continue effexor XR 75 mg daily  Increase Azstarys 52.3 mg daily  Follow-up 1 month    Record Review is below for 01/22/2024 :   No current or pertinent labs  EKG Results:  None in record  Head Imaging:  None in record    12/12/2023 Patient is taking medications as prescribed and is tolerating them well.   Azstarys is helping with her fidgeting, but brain still feels like a \"scrambled egg\"   She is going to be starting a new job at [employer]. Anxiety symptoms have improved.   Depression  Patient presents with the following symptoms: anhedonia, decreased concentration, depressed mood, excessive worry, fatigue, feelings of hopelessness, feelings of worthlessness, insomnia, irritability, memory impairment, nervousness/anxiety, psychomotor agitation and restlessness.  Patient is not experiencing: suicidal ideas, suicidal planning and thoughts of death.  Frequency of symptoms: most days   Severity: causing significant distress   Sleep quality: poor  Denies suicidal ideation.  Denies AVH.  We will continue to monitor for mood, behavior, and " "safety.  Continue effexor XR 75 mg daily  Increase Azstarys 39.2 mg daily  Follow-up 1 month    Record Review is below for 12/12/2023 :   No current or pertinent labs  EKG Results:  None in record  Head Imaging:  None in record      11/07/2023 Stopped taking her ADHD medication at age 17 because she didn't think she would need it anymore. \"I feel like I can't function in any area of my life\" Struggles with impulsivity, puts her foot in her mouth a lot, she has lost several jobs, trouble with grocery shopping. She is going to therapy weekly.   Depression  Visit Type: initial  Onset of symptoms: more than 5 years ago  Progression since onset: gradually worsening  Patient presents with the following symptoms: anhedonia, decreased concentration, depressed mood, excessive worry, fatigue, feelings of hopelessness, feelings of worthlessness, insomnia, irritability, memory impairment, nervousness/anxiety, psychomotor agitation and restlessness.  Patient is not experiencing: suicidal ideas, suicidal planning and thoughts of death.  Frequency of symptoms: most days   Severity: causing significant distress   Sleep quality: poor  Denies suicidal ideation. Denies AVH.     ADHD:  Symptoms are persistent and significantly interfere with major life activities and/or result in significant suffering  With focus on K5 through 6th grade only   Grades: Poor  Behavioral issues: \"I have gotten in trouble my whole life\"  Special Classes:Denies  Inattention:Yes  Referral for ADHD testing: Diagnosed age 6, Adderall     Often fails to give close attention to details or makes careless mistakes in schoolwork, at work, or during other activities:Yes  Often has difficulty sustaining attention in tasks:Yes  Often does not seem to listen when spoken to directly:Yes  Often does not follow through on instructions and fails to finish duties in the workplace:Yes  Often has difficulty organizing tasks and activities:Yes  Often avoids, dislikes or is " reluctant to engage in tasks that require sustained mental effort:Yes  Often loses things necessary for tasks or activities:Yes  Is often easily distracted by extraneous stimuli: Yes  Is often forgetful in daily activities: Yes  Hyperactivity and Impulsivity: Yes  Often fidgets with or taps hands or feet: Yes  Often leaves seat in situations when remaining seated is expected: Yes  Often feels restless: Yes  Is often “on the go”, acting as if “driven by a motor”: Yes  Often talks excessively: Yes  Often blurts out an answer before a question has been completed: Yes  Often has difficulty waiting their turn: Yes  Often interrupts or intrudes on others: Yes  Continue effexor XR 75 mg daily  Start Azstarys 26.1 mg daily  UDS  CSA  Follow-up 1 month    Record Review for 11/07/2023 : I have thoroughly reviewed the patient's electronic medical record to include previous encounters, care everywhere, notes, medications, labs, GABY and UDS (if applicable), imaging, and EKG's.  Pertinent information is included in this note.  No current or pertinent labs  EKG Results:  None in record  Head Imaging:  None in record    Per Referring Provider 1/17/2023  ADHD : Patient presents for follow-up regarding ADHD.  Recent urine drug screen significant for THC and benzodiazepine.  Discussed during a prior visit here in office.  Patient has been advised referral to psychiatry secondary to complaints regarding anxiety.  She admits to one visit with psychiatry and is now follow-up in March.  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     Past Psychiatric History:  Began Treatment: Age 6  Diagnoses: ADHD, anxiety, ODD,   Psychiatrist: Yes  Therapist: [facility]  Admission History: Several admissions as a child from age [age]  Medication Trials: Adderall, lexapro, zoloft, abilify, seroquel, guanfacine, hydroxyzine, buspar, effexor XR, atomoxetine  Suicide Attempts: Denies  Self Harm: Hx of cutting, 7 years since last incident,  superficial scarring joseluis arms, upper thighs  Substance use/abuse:Used to smoke marijuana, she has quit, she thought it was helping her symptoms   Withdrawal Symptoms: Not applicable  Longest Period Sober: Not applicable  AA: Not applicable  Trauma/Childhood/ACE:  Mom suffered with Alcohol use disorder  Access to Firearms: Denies    MENTAL STATUS EXAM   General Appearance:  Cleanly groomed and dressed and well developed  Eye Contact:  Good eye contact  Attitude:  Cooperative and polite  Motor Activity:  Normal gait, posture  Speech:  Normal rate, tone, volume  Mood and affect:  Normal, pleasant and euthymic  Hopelessness:  Denies  Thought Process:  Logical and goal-directed  Associations/ Thought Content:  No delusions  Hallucinations:  None  Suicidal Ideations:  Not present  Homicidal Ideation:  Not present  Sensorium:  Alert  Orientation:  Person, place, time and situation  Immediate Recall, Recent, and Remote Memory:  Intact  Attention Span/ Concentration:  Good  Fund of Knowledge:  Appropriate for age and educational level  Intellectual Functioning:  Average range  Insight:  Good  Judgement:  Good  Reliability:  Good  Impulse Control:  Good          Review of systems is negative except as noted in HPI.  Labs:  WBC   Date Value Ref Range Status   10/12/2021 2.65 (L) 3.40 - 10.80 10*3/mm3 Final     Platelets   Date Value Ref Range Status   10/12/2021 267 140 - 450 10*3/mm3 Final     Hemoglobin   Date Value Ref Range Status   10/12/2021 11.0 (L) 12.0 - 15.9 g/dL Final     Hematocrit   Date Value Ref Range Status   10/12/2021 34.4 34.0 - 46.6 % Final     Glucose   Date Value Ref Range Status   10/12/2021 77 65 - 99 mg/dL Final     Creatinine   Date Value Ref Range Status   10/12/2021 0.61 0.57 - 1.00 mg/dL Final     ALT (SGPT)   Date Value Ref Range Status   10/12/2021 11 1 - 33 U/L Final     AST (SGOT)   Date Value Ref Range Status   10/12/2021 16 1 - 32 U/L Final     BUN   Date Value Ref Range Status   10/12/2021 9  6 - 20 mg/dL Final     Total Cholesterol   Date Value Ref Range Status   10/12/2021 137 0 - 200 mg/dL Final     Triglycerides   Date Value Ref Range Status   10/12/2021 54 0 - 150 mg/dL Final     HDL Cholesterol   Date Value Ref Range Status   10/12/2021 44 40 - 60 mg/dL Final     LDL Cholesterol    Date Value Ref Range Status   10/12/2021 81 0 - 100 mg/dL Final     VLDL Cholesterol   Date Value Ref Range Status   10/12/2021 12 5 - 40 mg/dL Final     LDL/HDL Ratio   Date Value Ref Range Status   10/12/2021 1.87  Final     Hemoglobin A1C   Date Value Ref Range Status   09/13/2022 5.30 4.80 - 5.60 % Final     TSH   Date Value Ref Range Status   10/12/2021 0.768 0.270 - 4.200 uIU/mL Final      Pain Management Panel  More data may exist         Latest Ref Rng & Units 11/7/2023 10/12/2022   Pain Management Panel   Amphetamine, Urine Qual Negative Negative  -   Barbiturates Screen, Urine Negative Negative  Negative    Benzodiazepine Screen, Urine Negative Negative  Positive    Buprenorphine, Screen, Urine Negative Negative  -   Cocaine Screen, Urine Negative Negative  Negative    Methadone Screen , Urine Negative Negative  Negative    Methamphetamine, Ur Negative Negative  -      Imaging Results:  No Images in the past 120 days found..  Current Medications:   Current Outpatient Medications   Medication Sig Dispense Refill    Cholecalciferol (Vitamin D3) 1.25 MG (45216 UT) capsule Take 1 capsule by mouth 1 (One) Time Per Week.      medroxyPROGESTERone (DEPO-PROVERA) 150 MG/ML injection Inject 1 mL into the appropriate muscle as directed by prescriber Every 3 (Three) Months.      Serdexmethylphen-Dexmethylphen (azSTARys) 52.3-10.4 MG capsule Take 1 capsule by mouth Daily. 30 capsule 0    venlafaxine XR (EFFEXOR-XR) 75 MG 24 hr capsule Take 1 capsule by mouth Daily. 90 capsule 1     No current facility-administered medications for this visit.     Problem List:  Patient Active Problem List   Diagnosis    Anxiety and  depression    Attention deficit hyperactivity disorder (ADHD), predominantly inattentive type     Allergy:   Allergies   Allergen Reactions    Amoxicillin Anaphylaxis    Penicillin G Anaphylaxis      Discontinued Medications:  Medications Discontinued During This Encounter   Medication Reason    venlafaxine XR (EFFEXOR-XR) 75 MG 24 hr capsule Reorder    Serdexmethylphen-Dexmethylphen (azSTARys) 52.3-10.4 MG capsule Reorder         PLAN:   Presentation seems most consistent with DSM-V criteria for:  Diagnoses and all orders for this visit:    1. Attention deficit hyperactivity disorder (ADHD), predominantly hyperactive type (Primary)  -     Serdexmethylphen-Dexmethylphen (azSTARys) 52.3-10.4 MG capsule; Take 1 capsule by mouth Daily.  Dispense: 30 capsule; Refill: 0    2. Moderate episode of recurrent major depressive disorder  -     venlafaxine XR (EFFEXOR-XR) 75 MG 24 hr capsule; Take 1 capsule by mouth Daily.  Dispense: 90 capsule; Refill: 1    3. Generalized anxiety disorder  -     venlafaxine XR (EFFEXOR-XR) 75 MG 24 hr capsule; Take 1 capsule by mouth Daily.  Dispense: 90 capsule; Refill: 1       Continue effexor XR 75 mg daily  Continue Azstarys 52.3 mg daily  Follow-up 3 months  Medication Education:   EFFEXOR (VENLAFAXINE) Risks, benefits, alternatives discussed with patient including anorexia, constipation, dizziness, dry mouth, nausea, nervousness, somnolence, sweating, sexual side effects, headache and insomnia. After discussion of these risks and benefits, the patient voiced understanding and agreed to proceed.   AZSTARYS (serdexmethylphenidate/dexmethylphenidate) the most common side effects include decreased appetite, nausea, indigestion, weight loss, dizziness, mood swings, increased blood pressure, trouble sleeping, vomiting, stomach pain, anxiety, irritability, increased heart rate.  This medication is a controlled substance because it can be abused early to dependence.  Keep it in a safe place to  prevent misuse and abuse.  Please call your health care provider right away if there are any new or worsening mental symptoms or problems during treatment.  Medications:   New Medications Ordered This Visit   Medications    venlafaxine XR (EFFEXOR-XR) 75 MG 24 hr capsule     Sig: Take 1 capsule by mouth Daily.     Dispense:  90 capsule     Refill:  1    Serdexmethylphen-Dexmethylphen (azSTARys) 52.3-10.4 MG capsule     Sig: Take 1 capsule by mouth Daily.     Dispense:  30 capsule     Refill:  0      GABY reviewed.   Discussed medication options and treatment plan of prescribed medication as well as the risks, benefits, and side effects.  Patient verbalized understanding and is agreeable to this plan.   Patient is agreeable to call the office with any worsening of symptoms or onset of side effects.   Patient is agreeable to call 911 or go to the nearest ER should he/she begin having SI/HI.   Continue psychotherapeutic modalities as indicated.  Continue to challenge patterns of living conducive to pathology, strengthen defenses, promote problems solving, restore adaptive functioning and provide symptom relief.   Patient acknowledged and verbally consented to continue with current treatment plan and was educated on the importance of compliance with treatment and follow-up appointments.  Addressed all questions and concerns.     Psychotherapy:    Provided minimal supportive therapy.   Functional status:Good  Prognosis: Good  Progress: Continued improvement    Safety/Risk Assessment: Risk of self-harm acutely and chronically is low.    Risk factors include anxiety disorder, mood disorder, and recent psychosocial stressors.   Protective factors include no family history, denies access to guns/weapons, no present SI, no history of suicide attempts or self-harm in the past, minimal AODA, healthcare seeking, future orientation, willingness to engage in care.    Risk assessment could be further elevated in the event of  treatment noncompliance and/or AODA.    Follow-up: Return in about 3 months (around 5/28/2024) for Next scheduled follow up.         This document has been electronically signed by VASU Dennison  February 28, 2024 08:39 EST    Please note that portions of this note were completed with a voice recognition program.  Copied text in this note has been reviewed and is accurate as of 02/28/24

## 2024-02-28 NOTE — PATIENT INSTRUCTIONS
1.  Please return to clinic at your next scheduled visit.  Please contact the clinic ([phone]) at least 24 hours prior in the event you need to cancel.  2.  Do no harm to yourself or others.    3.  Avoid alcohol and drugs.    4.  Take all medications as prescribed.  Please contact the clinic with any concerns. If you are in need of medication refills, please call the clinic at [phone].    5. Should you want to get in touch with your provider, VASU Dennison, please contact the office ([phone]), and staff will be able to page Abi directly.  6. In the event you have personal crisis, contact the following crisis numbers: Suicide Prevention Hotline 1-210.154.6972; MARK Helpline 9-214-416-RYYR; [facility] Emergency Room [phone]; text HELLO to 868607; or 659.     SPECIFIC RECOMMENDATIONS:     1.      Medications discussed at this encounter:     New Medications Ordered This Visit   Medications    venlafaxine XR (EFFEXOR-XR) 75 MG 24 hr capsule     Sig: Take 1 capsule by mouth Daily.     Dispense:  90 capsule     Refill:  1    Serdexmethylphen-Dexmethylphen (azSTARys) 52.3-10.4 MG capsule     Sig: Take 1 capsule by mouth Daily.     Dispense:  30 capsule     Refill:  0                       2.      Psychotherapy recommendations: We will continue therapy at future visits.     3.     Return to clinic: Return in about 3 months (around 5/28/2024) for Next scheduled follow up.

## 2024-02-29 LAB
CONV .: NORMAL
CYTOLOGIST CVX/VAG CYTO: NORMAL
CYTOLOGY CVX/VAG DOC CYTO: NORMAL
CYTOLOGY CVX/VAG DOC THIN PREP: NORMAL
DX ICD CODE: NORMAL
Lab: NORMAL
OTHER STN SPEC: NORMAL
STAT OF ADQ CVX/VAG CYTO-IMP: NORMAL

## 2024-06-04 ENCOUNTER — TELEPHONE (OUTPATIENT)
Dept: PSYCHIATRY | Facility: CLINIC | Age: 24
End: 2024-06-04
Payer: MEDICAID

## 2024-06-04 ENCOUNTER — TELEMEDICINE (OUTPATIENT)
Dept: BEHAVIORAL HEALTH | Facility: CLINIC | Age: 24
End: 2024-06-04
Payer: MEDICAID

## 2024-06-04 DIAGNOSIS — F90.1 ATTENTION DEFICIT HYPERACTIVITY DISORDER (ADHD), PREDOMINANTLY HYPERACTIVE TYPE: Primary | ICD-10-CM

## 2024-06-04 DIAGNOSIS — F41.1 GENERALIZED ANXIETY DISORDER: ICD-10-CM

## 2024-06-04 DIAGNOSIS — Z79.899 MEDICATION MANAGEMENT: ICD-10-CM

## 2024-06-04 DIAGNOSIS — F33.1 MODERATE EPISODE OF RECURRENT MAJOR DEPRESSIVE DISORDER: ICD-10-CM

## 2024-06-04 PROCEDURE — 99214 OFFICE O/P EST MOD 30 MIN: CPT

## 2024-06-04 PROCEDURE — 1159F MED LIST DOCD IN RCRD: CPT

## 2024-06-04 PROCEDURE — 1160F RVW MEDS BY RX/DR IN RCRD: CPT

## 2024-06-04 RX ORDER — VENLAFAXINE HYDROCHLORIDE 75 MG/1
75 CAPSULE, EXTENDED RELEASE ORAL DAILY
Qty: 90 CAPSULE | Refills: 1 | Status: SHIPPED | OUTPATIENT
Start: 2024-06-04

## 2024-06-04 RX ORDER — SERDEXMETHYLPHENIDATE AND DEXMETHYLPHENIDATE 5.2; 26.1 MG/1; MG/1
26.1 CAPSULE ORAL DAILY
Qty: 30 CAPSULE | Refills: 0 | Status: SHIPPED | OUTPATIENT
Start: 2024-06-04

## 2024-06-04 NOTE — PATIENT INSTRUCTIONS
1.  Please return to clinic at your next scheduled visit.  Please contact the clinic ([phone]) at least 24 hours prior in the event you need to cancel.  2.  Do no harm to yourself or others.    3.  Avoid alcohol and drugs.    4.  Take all medications as prescribed.  Please contact the clinic with any concerns. If you are in need of medication refills, please call the clinic at [phone].    5. Should you want to get in touch with your provider, VASU Dennison, please contact the office ([phone]), and staff will be able to page Abi directly.  6. In the event you have personal crisis, contact the following crisis numbers: Suicide Prevention Hotline 1-536.935.5987; MARK Helpline 7-609-205-EALY; [facility] Emergency Room [phone]; text HELLO to 594891; or 124.     SPECIFIC RECOMMENDATIONS:     1.      Medications discussed at this encounter:     New Medications Ordered This Visit   Medications    Serdexmethylphen-Dexmethylphen (azSTARys) 26.1-5.2 MG capsule     Sig: Take 26.1 mg by mouth Daily.     Dispense:  30 capsule     Refill:  0     Thx 4 all U do!    venlafaxine XR (EFFEXOR-XR) 75 MG 24 hr capsule     Sig: Take 1 capsule by mouth Daily.     Dispense:  90 capsule     Refill:  1                       2.      Psychotherapy recommendations: We will continue therapy at future visits.     3.     Return to clinic: Return in about 6 weeks (around 7/16/2024).

## 2024-06-04 NOTE — PROGRESS NOTES
[facility]  Medication Management Follow-up  Virtual MyChart Visit  This provider is located at [address] [place] [address], [address], [place] [id number]. The Patient is located at home. Patient is being seen remotely using MyChart. Patient is being seen via telehealth and confirm that they are in a secure environment for this session. The patient's condition being diagnosed/treated is appropriate for telemedicine. The provider identified herself as well as her credentials. They may refuse to be seen remotely at any time. The electronic data is encrypted and password protected, and the patient has been advised of the potential risks to privacy not withstanding such measures. Virtual visit via Zoom audio and video due to the COVID-19 pandemic.  Patient is accepting of and agreeable to visit.  The visit consisted of the patient and I. PT Identifiers used: Name and .     Record Review is below for 2024 :   No current or pertinent labs  EKG Results:  None in record  Head Imaging:  None in record  Vital Signs:   There were no vitals taken for this visit.    Chief Complaint: ADHD. Depression. Anxiety.     History of Present Illness:   Hina Tucker is a 24 y.o. female who presents today for follow-up and medication management for:    ICD-10-CM ICD-9-CM   1. Attention deficit hyperactivity disorder (ADHD), predominantly hyperactive type  F90.1 314.01   2. Moderate episode of recurrent major depressive disorder  F33.1 296.32   3. Generalized anxiety disorder  F41.1 300.02   4. Medication management  Z79.899 V58.69       2024 Patient has not been taking medications for approximately 4 months related to insurance coverage issues.   Depression and Anxiety  Had lost her insurance coverage for an interim time-frame and was unable to have any treatments. Has noticed that she has called in to work a little more often. Progression of symptoms, frequency, and intensity is rapidly worsening. Patient  "continues to experience feelings of sadness, low mood, lost of interest in usual activities, anhedonia, crying spells, low energy, inability to focus and concentrate that may interfere with daily tasks,  brain fog, excessive anxiety and worry, anxiety, difficulty controlling the worry, restlessness, decreased motivation and these symptoms are causing significant distress or impairment. Patient denies feeling worthless, guilty, hopelessness,. Denies thinking about death and dying, suicidal ideation, planning, or intent to self-harm.  Denies AVH.  Clinically significant distress or impairment in social, occupational, or other important areas of functioning is rapidly worsening.  ADHD  Has not been able to take Azstarys for months related to insurance. Patient reports moderate impairment in the ability to carry out very short and simple instructions, maintain attention and concentration for extended periods, make simple work-related decisions, and complete a normal workday and workweek without interruptions from psychologically based symptoms. Symptoms are persistent and significantly interfere with major life activities and/or result in significant suffering.        02/28/2024 Patient is taking medications as prescribed and is tolerating them well.   \"I am doing really good, I have seen massive improvements in my life\" She is expressing extreme gratitude for the medications and improvement. She has also been able to maintain her job for the first time in a long while.   She is expressing some concern for possible insurance coverage, she is going to check with her HR department about her options.   Depression, anxiety, and ADHD are well-controlled with current medications.   Patient presents with gradual improvement of the following symptoms: anhedonia, decreased concentration, depressed mood, excessive worry, fatigue, feelings of hopelessness, feelings of worthlessness, insomnia, irritability, memory impairment, " "nervousness/anxiety, psychomotor agitation and restlessness.  Patient is not experiencing: suicidal ideas, suicidal planning and thoughts of death.  Frequency of symptoms: most days   Severity: causing significant distress   Sleep quality: improved, fair  Denies suicidal ideation.  Denies AVH.  We will continue to monitor for mood, behavior, and safety.  Continue effexor XR 75 mg daily  Continue Azstarys 52.3 mg daily  Follow-up 3 months    Record Review is below for 02/28/2024 :   No current or pertinent labs  EKG Results:  None in record  Head Imaging:  None in record    01/22/2024 Patient discontinued Effexor because she was afraid of interaction with her birth control method. We discussed that there have been no known interactions found with her antidepressant. She is going to restart medication because it was helping her. Azstarys is helping, she hasn't missed any work and she would normally have been terminated by now. Has made it full 3 months.    Depression  Patient presents with the following symptoms: anhedonia, decreased concentration, depressed mood, excessive worry, fatigue, feelings of hopelessness, feelings of worthlessness, insomnia, irritability, memory impairment, nervousness/anxiety, psychomotor agitation and restlessness.  Patient is not experiencing: suicidal ideas, suicidal planning and thoughts of death.  Frequency of symptoms: most days   Severity: causing significant distress   Sleep quality: poor  Denies suicidal ideation.  Denies AVH.  We will continue to monitor for mood, behavior, and safety.  Continue effexor XR 75 mg daily  Increase Azstarys 52.3 mg daily  Follow-up 1 month    Record Review is below for 01/22/2024 :   No current or pertinent labs  EKG Results:  None in record  Head Imaging:  None in record    12/12/2023 Patient is taking medications as prescribed and is tolerating them well.   Azstarys is helping with her fidgeting, but brain still feels like a \"scrambled egg\"   She is " "going to be starting a new job at [employer]. Anxiety symptoms have improved.   Depression  Patient presents with the following symptoms: anhedonia, decreased concentration, depressed mood, excessive worry, fatigue, feelings of hopelessness, feelings of worthlessness, insomnia, irritability, memory impairment, nervousness/anxiety, psychomotor agitation and restlessness.  Patient is not experiencing: suicidal ideas, suicidal planning and thoughts of death.  Frequency of symptoms: most days   Severity: causing significant distress   Sleep quality: poor  Denies suicidal ideation.  Denies AVH.  We will continue to monitor for mood, behavior, and safety.  Continue effexor XR 75 mg daily  Increase Azstarys 39.2 mg daily  Follow-up 1 month    Record Review is below for 12/12/2023 :   No current or pertinent labs  EKG Results:  None in record  Head Imaging:  None in record      11/07/2023 Stopped taking her ADHD medication at age 17 because she didn't think she would need it anymore. \"I feel like I can't function in any area of my life\" Struggles with impulsivity, puts her foot in her mouth a lot, she has lost several jobs, trouble with grocery shopping. She is going to therapy weekly.   Depression  Visit Type: initial  Onset of symptoms: more than 5 years ago  Progression since onset: gradually worsening  Patient presents with the following symptoms: anhedonia, decreased concentration, depressed mood, excessive worry, fatigue, feelings of hopelessness, feelings of worthlessness, insomnia, irritability, memory impairment, nervousness/anxiety, psychomotor agitation and restlessness.  Patient is not experiencing: suicidal ideas, suicidal planning and thoughts of death.  Frequency of symptoms: most days   Severity: causing significant distress   Sleep quality: poor  Denies suicidal ideation. Denies AVH.     ADHD:  Symptoms are persistent and significantly interfere with major life activities and/or result in significant " "suffering  With focus on K5 through 6th grade only   Grades: Poor  Behavioral issues: \"I have gotten in trouble my whole life\"  Special Classes:Denies  Inattention:Yes  Referral for ADHD testing: Diagnosed age 6, Adderall     Often fails to give close attention to details or makes careless mistakes in schoolwork, at work, or during other activities:Yes  Often has difficulty sustaining attention in tasks:Yes  Often does not seem to listen when spoken to directly:Yes  Often does not follow through on instructions and fails to finish duties in the workplace:Yes  Often has difficulty organizing tasks and activities:Yes  Often avoids, dislikes or is reluctant to engage in tasks that require sustained mental effort:Yes  Often loses things necessary for tasks or activities:Yes  Is often easily distracted by extraneous stimuli: Yes  Is often forgetful in daily activities: Yes  Hyperactivity and Impulsivity: Yes  Often fidgets with or taps hands or feet: Yes  Often leaves seat in situations when remaining seated is expected: Yes  Often feels restless: Yes  Is often “on the go”, acting as if “driven by a motor”: Yes  Often talks excessively: Yes  Often blurts out an answer before a question has been completed: Yes  Often has difficulty waiting their turn: Yes  Often interrupts or intrudes on others: Yes  Continue effexor XR 75 mg daily  Start Azstarys 26.1 mg daily  UDS  CSA  Follow-up 1 month    Record Review for 11/07/2023 : I have thoroughly reviewed the patient's electronic medical record to include previous encounters, care everywhere, notes, medications, labs, GABY and UDS (if applicable), imaging, and EKG's.  Pertinent information is included in this note.  No current or pertinent labs  EKG Results:  None in record  Head Imaging:  None in record    Per Referring Provider 1/17/2023  ADHD : Patient presents for follow-up regarding ADHD.  Recent urine drug screen significant for THC and benzodiazepine.  Discussed during " a prior visit here in office.  Patient has been advised referral to psychiatry secondary to complaints regarding anxiety.  She admits to one visit with psychiatry and is now follow-up in March.  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     Past Psychiatric History:  Began Treatment: Age 6  Diagnoses: ADHD, anxiety, ODD,   Psychiatrist: Yes  Therapist: [facility]  Admission History: Several admissions as a child from age [age]  Medication Trials: Adderall, lexapro, zoloft, abilify, seroquel, guanfacine, hydroxyzine, buspar, effexor XR, atomoxetine  Suicide Attempts: Denies  Self Harm: Hx of cutting, 7 years since last incident, superficial scarring joseluis arms, upper thighs  Substance use/abuse:Used to smoke marijuana, she has quit, she thought it was helping her symptoms   Withdrawal Symptoms: Not applicable  Longest Period Sober: Not applicable  AA: Not applicable  Trauma/Childhood/ACE:  Mom suffered with Alcohol use disorder  Access to Firearms: Denies    Safety/Risk Assessment: Risk of self-harm acutely and chronically is moderate to severe.    Static/Dynamic risk factors include diagnosis of mental disorder, psychosocial stressors,Previous self-harm, Recent stressor or loss, and Social factors.      MENTAL STATUS EXAM   General Appearance:  Cleanly groomed and dressed and well developed  Eye Contact:  Good eye contact  Attitude:  Cooperative and polite  Motor Activity:  Normal gait, posture  Speech:  Normal rate, tone, volume  Mood and affect:  Normal, pleasant and euthymic  Hopelessness:  Denies  Thought Process:  Logical and goal-directed  Associations/ Thought Content:  No delusions  Hallucinations:  None  Suicidal Ideations:  Not present  Homicidal Ideation:  Not present  Sensorium:  Alert  Orientation:  Person, place, time and situation  Immediate Recall, Recent, and Remote Memory:  Intact  Attention Span/ Concentration:  Good  Fund of Knowledge:  Appropriate for age and educational  level  Intellectual Functioning:  Average range  Insight:  Good  Judgement:  Good  Reliability:  Good  Impulse Control:  Good          Review of systems is negative except as noted in HPI.  Labs:  WBC   Date Value Ref Range Status   10/12/2021 2.65 (L) 3.40 - 10.80 10*3/mm3 Final     Platelets   Date Value Ref Range Status   10/12/2021 267 140 - 450 10*3/mm3 Final     Hemoglobin   Date Value Ref Range Status   10/12/2021 11.0 (L) 12.0 - 15.9 g/dL Final     Hematocrit   Date Value Ref Range Status   10/12/2021 34.4 34.0 - 46.6 % Final     Glucose   Date Value Ref Range Status   10/12/2021 77 65 - 99 mg/dL Final     Creatinine   Date Value Ref Range Status   10/12/2021 0.61 0.57 - 1.00 mg/dL Final     ALT (SGPT)   Date Value Ref Range Status   10/12/2021 11 1 - 33 U/L Final     AST (SGOT)   Date Value Ref Range Status   10/12/2021 16 1 - 32 U/L Final     BUN   Date Value Ref Range Status   10/12/2021 9 6 - 20 mg/dL Final     Total Cholesterol   Date Value Ref Range Status   10/12/2021 137 0 - 200 mg/dL Final     Triglycerides   Date Value Ref Range Status   10/12/2021 54 0 - 150 mg/dL Final     HDL Cholesterol   Date Value Ref Range Status   10/12/2021 44 40 - 60 mg/dL Final     LDL Cholesterol    Date Value Ref Range Status   10/12/2021 81 0 - 100 mg/dL Final     VLDL Cholesterol   Date Value Ref Range Status   10/12/2021 12 5 - 40 mg/dL Final     LDL/HDL Ratio   Date Value Ref Range Status   10/12/2021 1.87  Final     Hemoglobin A1C   Date Value Ref Range Status   09/13/2022 5.30 4.80 - 5.60 % Final     TSH   Date Value Ref Range Status   10/12/2021 0.768 0.270 - 4.200 uIU/mL Final      Pain Management Panel  More data may exist         Latest Ref Rng & Units 11/7/2023 10/12/2022   Pain Management Panel   Amphetamine, Urine Qual Negative Negative  -   Barbiturates Screen, Urine Negative Negative  Negative    Benzodiazepine Screen, Urine Negative Negative  Positive    Buprenorphine, Screen, Urine Negative Negative  -    Cocaine Screen, Urine Negative Negative  Negative    Methadone Screen , Urine Negative Negative  Negative    Methamphetamine, Ur Negative Negative  -      Imaging Results:  No Images in the past 120 days found..  Current Medications:   Current Outpatient Medications   Medication Sig Dispense Refill    venlafaxine XR (EFFEXOR-XR) 75 MG 24 hr capsule Take 1 capsule by mouth Daily. 90 capsule 1    Cholecalciferol (Vitamin D3) 1.25 MG (28453 UT) capsule Take 1 capsule by mouth 1 (One) Time Per Week.      medroxyPROGESTERone (DEPO-PROVERA) 150 MG/ML injection Inject 1 mL into the appropriate muscle as directed by prescriber Every 3 (Three) Months.      Serdexmethylphen-Dexmethylphen (azSTARys) 26.1-5.2 MG capsule Take 26.1 mg by mouth Daily. 30 capsule 0     No current facility-administered medications for this visit.     Problem List:  Patient Active Problem List   Diagnosis    Anxiety and depression    Attention deficit hyperactivity disorder (ADHD), predominantly inattentive type     Allergy:   Allergies   Allergen Reactions    Amoxicillin Anaphylaxis    Penicillin G Anaphylaxis      Discontinued Medications:  Medications Discontinued During This Encounter   Medication Reason    Serdexmethylphen-Dexmethylphen (azSTARys) 52.3-10.4 MG capsule Dose adjustment    venlafaxine XR (EFFEXOR-XR) 75 MG 24 hr capsule Reorder       PLAN:   Presentation seems most consistent with DSM-V criteria for:  Diagnoses and all orders for this visit:    1. Attention deficit hyperactivity disorder (ADHD), predominantly hyperactive type (Primary)  -     Serdexmethylphen-Dexmethylphen (azSTARys) 26.1-5.2 MG capsule; Take 26.1 mg by mouth Daily.  Dispense: 30 capsule; Refill: 0    2. Moderate episode of recurrent major depressive disorder  -     venlafaxine XR (EFFEXOR-XR) 75 MG 24 hr capsule; Take 1 capsule by mouth Daily.  Dispense: 90 capsule; Refill: 1    3. Generalized anxiety disorder  -     venlafaxine XR (EFFEXOR-XR) 75 MG 24 hr  capsule; Take 1 capsule by mouth Daily.  Dispense: 90 capsule; Refill: 1    4. Medication management  -     Urine Drug Screen - Urine, Clean Catch; Future       Restart effexor XR 75 mg daily  Start Azstarys 26.1 mg daily  Follow-up 6 months  Medication Education:   EFFEXOR (VENLAFAXINE) Risks, benefits, alternatives discussed with patient including anorexia, constipation, dizziness, dry mouth, nausea, nervousness, somnolence, sweating, sexual side effects, headache and insomnia. After discussion of these risks and benefits, the patient voiced understanding and agreed to proceed.   AZSTARYS (serdexmethylphenidate/dexmethylphenidate) the most common side effects include decreased appetite, nausea, indigestion, weight loss, dizziness, mood swings, increased blood pressure, trouble sleeping, vomiting, stomach pain, anxiety, irritability, increased heart rate.  This medication is a controlled substance because it can be abused early to dependence.  Keep it in a safe place to prevent misuse and abuse.  Please call your health care provider right away if there are any new or worsening mental symptoms or problems during treatment.  Medications:   New Medications Ordered This Visit   Medications    Serdexmethylphen-Dexmethylphen (azSTARys) 26.1-5.2 MG capsule     Sig: Take 26.1 mg by mouth Daily.     Dispense:  30 capsule     Refill:  0     Thx 4 all U do!    venlafaxine XR (EFFEXOR-XR) 75 MG 24 hr capsule     Sig: Take 1 capsule by mouth Daily.     Dispense:  90 capsule     Refill:  1      GABY reviewed.   Discussed medication options and treatment plan of prescribed medication as well as the risks, benefits, and side effects.  Patient is agreeable to call the office with any worsening of symptoms or onset of side effects.   Patient is agreeable to call 911 or go to the nearest ER should he/she begin having SI/HI.   Patient acknowledged, is agreeable to continue with current treatment plan, and was educated on the  importance of compliance with treatment and follow-up appointments.  Addressed all questions and concerns.     Psychotherapy:    Provided minimal supportive therapy.  Functional status: Moderate symptoms OR moderate difficulty in social occupational or social functioning (51-60)  Prognosis: Fair with expectation for some response to treatment  Progress: rapidly worsening      Follow-up: Return in about 6 weeks (around 7/16/2024).     This document has been electronically signed by VASU Dennison  June 18, 2024 15:05 EDT  Please note that portions of this note were completed with a voice recognition program.  Copied text in this note has been reviewed and is accurate as of 06/18/24

## 2024-07-03 ENCOUNTER — LAB (OUTPATIENT)
Dept: LAB | Facility: HOSPITAL | Age: 24
End: 2024-07-03

## 2024-07-03 DIAGNOSIS — Z79.899 MEDICATION MANAGEMENT: ICD-10-CM

## 2024-07-03 LAB
AMPHET+METHAMPHET UR QL: NEGATIVE
BARBITURATES UR QL SCN: NEGATIVE
BENZODIAZ UR QL SCN: NEGATIVE
CANNABINOIDS SERPL QL: NEGATIVE
COCAINE UR QL: NEGATIVE
FENTANYL UR-MCNC: NEGATIVE NG/ML
METHADONE UR QL SCN: NEGATIVE
OPIATES UR QL: NEGATIVE
OXYCODONE UR QL SCN: NEGATIVE

## 2024-07-03 PROCEDURE — 80307 DRUG TEST PRSMV CHEM ANLYZR: CPT

## 2024-08-30 DIAGNOSIS — F33.1 MODERATE EPISODE OF RECURRENT MAJOR DEPRESSIVE DISORDER: ICD-10-CM

## 2024-08-30 DIAGNOSIS — F41.1 GENERALIZED ANXIETY DISORDER: ICD-10-CM

## 2024-08-30 DIAGNOSIS — F90.1 ATTENTION DEFICIT HYPERACTIVITY DISORDER (ADHD), PREDOMINANTLY HYPERACTIVE TYPE: Primary | ICD-10-CM

## 2024-09-03 NOTE — TELEPHONE ENCOUNTER
REFILL REQUEST FROM THE PHARMACY FOR PTS EFFEXOR XR 75 MG CAPSULES.    venlafaxine XR (EFFEXOR-XR) 75 MG 24 hr capsule (06/04/2024)     FOLLOW UP APPT-10/04/2024.  PT LAST SEEN ON 06/04/2024.  PROVIDER CANCEL FOR APPT ON 07/22/2024.    I CALLED AND SPOKE TO PT AND SCHEDULED THE FOLLOW UP.    PT REPORTED THAT HER INSURANCE GOT CANCELED AND SHE IS STILL TRYING TO FIND OUT WHAT SHE NEEDS TO DO TO GET INSURANCE THRU HER WORK.    PT STATED THAT HER CURRENT PRESCRIPTIONS ARE GOING TO BE EXPENSIVE. SHE WOULD LIKE TO DISCUSS WITH PROVIDER POSSIBLE COST EFFICIENT MEDICATIONS.

## 2024-09-03 NOTE — TELEPHONE ENCOUNTER
Patient's venlafaxine is present on the [facility] low-cost Rx program list. Azstarys is not currently offered in generic formulation, patient may wish to phone pharmacy to ask about cost for generic ADHD medications, such as amphetamine-dextroamphetamine, methylphenidate, or lisdexamfetamine. Please have patient let us know if she would like to change preferred pharmacy related to cost.

## 2024-09-04 NOTE — TELEPHONE ENCOUNTER
I ATTEMPTED TO CONTACT PT VIA TELEPHONE TO RELAY OF PROVIDERS MESSAGE AND INFORMATION.    NO ANSWER.  VOICEMAIL MESSAGE LEFT REQUESTING THAT PT CALL OUR OFFICE BACK.

## 2024-09-05 NOTE — TELEPHONE ENCOUNTER
I CALLED AND SPOKE TO PT.    I RELAYED OF PROVIDERS MESSAGE VERBATIM.    PT EXPRESSED GRATITUDE AND UNDERSTANDING.     SHE IS GOING TO REACH OUT TO HER [facility] TO SEE WHAT SHE CAN FIND OUT.    SHE WILL FOLLOW BACK UP WITH US AND LET US KNOW IF SHE WANTS TO SWITCH PHARMACIES.

## 2024-10-04 ENCOUNTER — TELEMEDICINE (OUTPATIENT)
Dept: BEHAVIORAL HEALTH | Facility: CLINIC | Age: 24
End: 2024-10-04

## 2024-10-04 DIAGNOSIS — F41.1 GENERALIZED ANXIETY DISORDER: ICD-10-CM

## 2024-10-04 DIAGNOSIS — F33.1 MODERATE EPISODE OF RECURRENT MAJOR DEPRESSIVE DISORDER: Primary | ICD-10-CM

## 2024-10-04 DIAGNOSIS — F90.1 ATTENTION DEFICIT HYPERACTIVITY DISORDER (ADHD), PREDOMINANTLY HYPERACTIVE TYPE: ICD-10-CM

## 2024-10-04 RX ORDER — DEXTROAMPHETAMINE SACCHARATE, AMPHETAMINE ASPARTATE MONOHYDRATE, DEXTROAMPHETAMINE SULFATE AND AMPHETAMINE SULFATE 2.5; 2.5; 2.5; 2.5 MG/1; MG/1; MG/1; MG/1
10 CAPSULE, EXTENDED RELEASE ORAL EVERY MORNING
Qty: 30 CAPSULE | Refills: 0 | Status: SHIPPED | OUTPATIENT
Start: 2024-10-04

## 2024-10-04 RX ORDER — VENLAFAXINE HYDROCHLORIDE 75 MG/1
75 CAPSULE, EXTENDED RELEASE ORAL DAILY
Qty: 30 CAPSULE | Refills: 2 | Status: SHIPPED | OUTPATIENT
Start: 2024-10-04

## 2024-10-04 NOTE — PROGRESS NOTES
"[facility]/Psychiatry  Medication Management Follow-up  Virtual MyChart Visit  This provider is located at [address], [address], [place] [address]. The Patient is located at home.  Patient is being seen remotely using MyChart. Patient is being seen via telehealth and confirm that they are in a secure environment for this session. The patient's condition being diagnosed/treated is appropriate for telemedicine. The provider identified herself as well as her credentials. They may refuse to be seen remotely at any time. The electronic data is encrypted and password protected, and the patient has been advised of the potential risks to privacy not withstanding such measures. Virtual visit via Zoom audio and video due to the COVID-19 pandemic.  Patient is accepting of and agreeable to visit.  The visit consisted of the patient and I. PT Identifiers used: Name and .    Record Review is below for 10/04/2024 :   No current or pertinent labs  EKG Results:  None in record  Head Imaging:  None in record  Vital Signs:   There were no vitals taken for this visit.    Chief Complaint: ADHD. Depression. Anxiety.     History of Present Illness:   Hina Tucker is a 24 y.o. female who presents today for follow-up and medication management for:    ICD-10-CM ICD-9-CM   1. Moderate episode of recurrent major depressive disorder  F33.1 296.32   2. Generalized anxiety disorder  F41.1 300.02   3. Attention deficit hyperactivity disorder (ADHD), predominantly hyperactive type  F90.1 314.01       10/04/2024  Depression and Anxiety  \"I am kind drowning\" Has been struggling with insurance coverage and access issues.  Patient has been off medications for several months.  Trying to start school, financial struggles, needs to purchase a car. Progression of symptoms, frequency, and intensity is worsening. Patient continues to experience feelings of sadness, low mood, lost of interest in usual activities, psychomotor " agitation, excessive anxiety and worry, anxiety, difficulty controlling the worry, restlessness, feeling keyed up or on edge, irritability, muscle tension, decreased motivation and these symptoms are causing significant distress or impairment. Patient denies feeling worthless, guilty, hopelessness,. Denies thinking about death and dying, suicidal ideation, planning, or intent to self-harm.  Denies AVH.  Clinically significant distress or impairment in social, occupational, or other important areas of functioning is worsening.  ADHD  Progression of symptoms, frequency, and intensity is worsening. Patient reports worsening in the ability to carry out very short and simple instructions, maintain attention and concentration for extended periods, make simple work-related decisions, and complete a normal workday and workweek without interruptions from psychologically based symptoms. Clinically significant distress or impairment in social, occupational, or other important areas of functioning is worsening.    05/28/2024 Patient has not been taking medications for approximately 4 months related to insurance coverage issues.   Depression and Anxiety  Had lost her insurance coverage for an interim time-frame and was unable to have any treatments. Has noticed that she has called in to work a little more often. Progression of symptoms, frequency, and intensity is rapidly worsening. Patient continues to experience feelings of sadness, low mood, lost of interest in usual activities, anhedonia, crying spells, low energy, inability to focus and concentrate that may interfere with daily tasks,  brain fog, excessive anxiety and worry, anxiety, difficulty controlling the worry, restlessness, decreased motivation and these symptoms are causing significant distress or impairment. Patient denies feeling worthless, guilty, hopelessness,. Denies thinking about death and dying, suicidal ideation, planning, or intent to self-harm.  Denies  "AVH.  Clinically significant distress or impairment in social, occupational, or other important areas of functioning is rapidly worsening.  ADHD  Has not been able to take Azstarys for months related to insurance. Patient reports moderate impairment in the ability to carry out very short and simple instructions, maintain attention and concentration for extended periods, make simple work-related decisions, and complete a normal workday and workweek without interruptions from psychologically based symptoms. Symptoms are persistent and significantly interfere with major life activities and/or result in significant suffering.  Restart effexor XR 75 mg daily  Start Azstarys 26.1 mg daily  Follow-up 6 months      02/28/2024 Patient is taking medications as prescribed and is tolerating them well.   \"I am doing really good, I have seen massive improvements in my life\" She is expressing extreme gratitude for the medications and improvement. She has also been able to maintain her job for the first time in a long while.   She is expressing some concern for possible insurance coverage, she is going to check with her HR department about her options.   Depression, anxiety, and ADHD are well-controlled with current medications.   Patient presents with gradual improvement of the following symptoms: anhedonia, decreased concentration, depressed mood, excessive worry, fatigue, feelings of hopelessness, feelings of worthlessness, insomnia, irritability, memory impairment, nervousness/anxiety, psychomotor agitation and restlessness.  Patient is not experiencing: suicidal ideas, suicidal planning and thoughts of death.  Frequency of symptoms: most days   Severity: causing significant distress   Sleep quality: improved, fair  Denies suicidal ideation.  Denies AVH.  We will continue to monitor for mood, behavior, and safety.  Continue effexor XR 75 mg daily  Continue Azstarys 52.3 mg daily  Follow-up 3 months    Record Review is below for " "02/28/2024 :   No current or pertinent labs  EKG Results:  None in record  Head Imaging:  None in record    01/22/2024 Patient discontinued Effexor because she was afraid of interaction with her birth control method. We discussed that there have been no known interactions found with her antidepressant. She is going to restart medication because it was helping her. Azstarys is helping, she hasn't missed any work and she would normally have been terminated by now. Has made it full 3 months.    Depression  Patient presents with the following symptoms: anhedonia, decreased concentration, depressed mood, excessive worry, fatigue, feelings of hopelessness, feelings of worthlessness, insomnia, irritability, memory impairment, nervousness/anxiety, psychomotor agitation and restlessness.  Patient is not experiencing: suicidal ideas, suicidal planning and thoughts of death.  Frequency of symptoms: most days   Severity: causing significant distress   Sleep quality: poor  Denies suicidal ideation.  Denies AVH.  We will continue to monitor for mood, behavior, and safety.  Continue effexor XR 75 mg daily  Increase Azstarys 52.3 mg daily  Follow-up 1 month    Record Review is below for 01/22/2024 :   No current or pertinent labs  EKG Results:  None in record  Head Imaging:  None in record    12/12/2023 Patient is taking medications as prescribed and is tolerating them well.   Azstarys is helping with her fidgeting, but brain still feels like a \"scrambled egg\"   She is going to be starting a new job at [employer]. Anxiety symptoms have improved.   Depression  Patient presents with the following symptoms: anhedonia, decreased concentration, depressed mood, excessive worry, fatigue, feelings of hopelessness, feelings of worthlessness, insomnia, irritability, memory impairment, nervousness/anxiety, psychomotor agitation and restlessness.  Patient is not experiencing: suicidal ideas, suicidal planning and thoughts of death.  Frequency of " "symptoms: most days   Severity: causing significant distress   Sleep quality: poor  Denies suicidal ideation.  Denies AVH.  We will continue to monitor for mood, behavior, and safety.  Continue effexor XR 75 mg daily  Increase Azstarys 39.2 mg daily  Follow-up 1 month    Record Review is below for 12/12/2023 :   No current or pertinent labs  EKG Results:  None in record  Head Imaging:  None in record      11/07/2023 Stopped taking her ADHD medication at age 17 because she didn't think she would need it anymore. \"I feel like I can't function in any area of my life\" Struggles with impulsivity, puts her foot in her mouth a lot, she has lost several jobs, trouble with grocery shopping. She is going to therapy weekly.   Depression  Visit Type: initial  Onset of symptoms: more than 5 years ago  Progression since onset: gradually worsening  Patient presents with the following symptoms: anhedonia, decreased concentration, depressed mood, excessive worry, fatigue, feelings of hopelessness, feelings of worthlessness, insomnia, irritability, memory impairment, nervousness/anxiety, psychomotor agitation and restlessness.  Patient is not experiencing: suicidal ideas, suicidal planning and thoughts of death.  Frequency of symptoms: most days   Severity: causing significant distress   Sleep quality: poor  Denies suicidal ideation. Denies AVH.     ADHD:  Symptoms are persistent and significantly interfere with major life activities and/or result in significant suffering  With focus on K5 through 6th grade only   Grades: Poor  Behavioral issues: \"I have gotten in trouble my whole life\"  Special Classes:Denies  Inattention:Yes  Referral for ADHD testing: Diagnosed age 6, Adderall     Often fails to give close attention to details or makes careless mistakes in schoolwork, at work, or during other activities:Yes  Often has difficulty sustaining attention in tasks:Yes  Often does not seem to listen when spoken to directly:Yes  Often " does not follow through on instructions and fails to finish duties in the workplace:Yes  Often has difficulty organizing tasks and activities:Yes  Often avoids, dislikes or is reluctant to engage in tasks that require sustained mental effort:Yes  Often loses things necessary for tasks or activities:Yes  Is often easily distracted by extraneous stimuli: Yes  Is often forgetful in daily activities: Yes  Hyperactivity and Impulsivity: Yes  Often fidgets with or taps hands or feet: Yes  Often leaves seat in situations when remaining seated is expected: Yes  Often feels restless: Yes  Is often “on the go”, acting as if “driven by a motor”: Yes  Often talks excessively: Yes  Often blurts out an answer before a question has been completed: Yes  Often has difficulty waiting their turn: Yes  Often interrupts or intrudes on others: Yes  Continue effexor XR 75 mg daily  Start Azstarys 26.1 mg daily  UDS  CSA  Follow-up 1 month    Record Review for 11/07/2023 : I have thoroughly reviewed the patient's electronic medical record to include previous encounters, care everywhere, notes, medications, labs, GABY and UDS (if applicable), imaging, and EKG's.  Pertinent information is included in this note.  No current or pertinent labs  EKG Results:  None in record  Head Imaging:  None in record    Per Referring Provider 1/17/2023  ADHD : Patient presents for follow-up regarding ADHD.  Recent urine drug screen significant for THC and benzodiazepine.  Discussed during a prior visit here in office.  Patient has been advised referral to psychiatry secondary to complaints regarding anxiety.  She admits to one visit with psychiatry and is now follow-up in March.  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     Past Psychiatric History:  Began Treatment: Age 6  Diagnoses: ADHD, anxiety, ODD,   Psychiatrist: Yes  Therapist: [facility]  Admission History: Several admissions as a child from age [age]  Medication Trials:  Adderall, lexapro, zoloft, abilify, seroquel, guanfacine, hydroxyzine, buspar, effexor XR, atomoxetine  Suicide Attempts: Denies  Self Harm: Hx of cutting, 7 years since last incident, superficial scarring joseluis arms, upper thighs  Substance use/abuse:Used to smoke marijuana, she has quit, she thought it was helping her symptoms   Withdrawal Symptoms: Not applicable  Longest Period Sober: Not applicable  AA: Not applicable  Trauma/Childhood/ACE:  Mom suffered with Alcohol use disorder  Access to Firearms: Denies    Safety/Risk Assessment: Risk of self-harm acutely and chronically is moderate to severe.    Static/Dynamic risk factors include diagnosis of mental disorder, psychosocial stressors,Previous self-harm, Recent stressor or loss, and Social factors.      MENTAL STATUS EXAM   General Appearance:  Cleanly groomed and dressed and well developed  Eye Contact:  Good eye contact  Attitude:  Cooperative and polite  Motor Activity:  Normal gait, posture  Speech:  Normal rate, tone, volume  Mood and affect:  Normal, pleasant and euthymic  Hopelessness:  Denies  Thought Process:  Logical and goal-directed  Associations/ Thought Content:  No delusions  Hallucinations:  None  Suicidal Ideations:  Not present  Homicidal Ideation:  Not present  Sensorium:  Alert  Orientation:  Person, place, time and situation  Immediate Recall, Recent, and Remote Memory:  Intact  Attention Span/ Concentration:  Good  Fund of Knowledge:  Appropriate for age and educational level  Intellectual Functioning:  Average range  Insight:  Good  Judgement:  Good  Reliability:  Good  Impulse Control:  Good       Review of systems is negative except as noted in HPI.  Labs:  WBC   Date Value Ref Range Status   10/12/2021 2.65 (L) 3.40 - 10.80 10*3/mm3 Final     Platelets   Date Value Ref Range Status   10/12/2021 267 140 - 450 10*3/mm3 Final     Hemoglobin   Date Value Ref Range Status   10/12/2021 11.0 (L) 12.0 - 15.9 g/dL Final     Hematocrit   Date  Value Ref Range Status   10/12/2021 34.4 34.0 - 46.6 % Final     Glucose   Date Value Ref Range Status   10/12/2021 77 65 - 99 mg/dL Final     Creatinine   Date Value Ref Range Status   10/12/2021 0.61 0.57 - 1.00 mg/dL Final     ALT (SGPT)   Date Value Ref Range Status   10/12/2021 11 1 - 33 U/L Final     AST (SGOT)   Date Value Ref Range Status   10/12/2021 16 1 - 32 U/L Final     BUN   Date Value Ref Range Status   10/12/2021 9 6 - 20 mg/dL Final     Total Cholesterol   Date Value Ref Range Status   10/12/2021 137 0 - 200 mg/dL Final     Triglycerides   Date Value Ref Range Status   10/12/2021 54 0 - 150 mg/dL Final     HDL Cholesterol   Date Value Ref Range Status   10/12/2021 44 40 - 60 mg/dL Final     LDL Cholesterol    Date Value Ref Range Status   10/12/2021 81 0 - 100 mg/dL Final     VLDL Cholesterol   Date Value Ref Range Status   10/12/2021 12 5 - 40 mg/dL Final     LDL/HDL Ratio   Date Value Ref Range Status   10/12/2021 1.87  Final     Hemoglobin A1C   Date Value Ref Range Status   09/13/2022 5.30 4.80 - 5.60 % Final     TSH   Date Value Ref Range Status   10/12/2021 0.768 0.270 - 4.200 uIU/mL Final      Pain Management Panel  More data exists         Latest Ref Rng & Units 7/3/2024 11/7/2023   Pain Management Panel   Amphetamine, Urine Qual Negative - Negative    Barbiturates Screen, Urine Negative Negative  Negative    Benzodiazepine Screen, Urine Negative Negative  Negative    Buprenorphine, Screen, Urine Negative - Negative    Cocaine Screen, Urine Negative Negative  Negative    Fentanyl, Urine Negative Negative  -   Methadone Screen , Urine Negative Negative  Negative    Methamphetamine, Ur Negative - Negative       Details                  Imaging Results:  No Images in the past 120 days found..  Current Medications:   Current Outpatient Medications   Medication Sig Dispense Refill    amphetamine-dextroamphetamine XR (ADDERALL XR) 10 MG 24 hr capsule Take 1 capsule by mouth Every Morning 30  capsule 0    Cholecalciferol (Vitamin D3) 1.25 MG (90934 UT) capsule Take 1 capsule by mouth 1 (One) Time Per Week.      medroxyPROGESTERone (DEPO-PROVERA) 150 MG/ML injection Inject 1 mL into the appropriate muscle as directed by prescriber Every 3 (Three) Months.      venlafaxine XR (EFFEXOR-XR) 75 MG 24 hr capsule Take 1 capsule by mouth Daily. 30 capsule 2     No current facility-administered medications for this visit.     Problem List:  Patient Active Problem List   Diagnosis    Anxiety and depression    Attention deficit hyperactivity disorder (ADHD), predominantly inattentive type     Allergy:   Allergies   Allergen Reactions    Amoxicillin Anaphylaxis    Penicillin G Anaphylaxis      Discontinued Medications:  Medications Discontinued During This Encounter   Medication Reason    Serdexmethylphen-Dexmethylphen (azSTARys) 26.1-5.2 MG capsule Alternate therapy    venlafaxine XR (EFFEXOR-XR) 75 MG 24 hr capsule Alternate therapy       PLAN:   Presentation seems most consistent with DSM-V criteria for:  Diagnoses and all orders for this visit:    1. Moderate episode of recurrent major depressive disorder (Primary)  -     venlafaxine XR (EFFEXOR-XR) 75 MG 24 hr capsule; Take 1 capsule by mouth Daily.  Dispense: 30 capsule; Refill: 2    2. Generalized anxiety disorder  -     venlafaxine XR (EFFEXOR-XR) 75 MG 24 hr capsule; Take 1 capsule by mouth Daily.  Dispense: 30 capsule; Refill: 2    3. Attention deficit hyperactivity disorder (ADHD), predominantly hyperactive type  -     amphetamine-dextroamphetamine XR (ADDERALL XR) 10 MG 24 hr capsule; Take 1 capsule by mouth Every Morning  Dispense: 30 capsule; Refill: 0          Restart effexor XR 75 mg daily  Discontinue Azstarys   Start Adderall XR 10 mg daily  Follow-up 3 month  Medication Education:   EFFEXOR (VENLAFAXINE) Risks, benefits, alternatives discussed with patient including anorexia, constipation, dizziness, dry mouth, nausea, nervousness, somnolence,  sweating, sexual side effects, headache and insomnia. After discussion of these risks and benefits, the patient voiced understanding and agreed to proceed.   ADDERALL (AMPHETAMINE) Risks, benefits, side effects discussed with patient including elevated heart rate, elevated blood pressure, irritability, insomnia, sexual dysfunction, appetite suppressing properties, psychosis.  After discussion of these risks and benefits, the patient voiced understanding and agreed to proceed. Gaby reviewed, UDS ordered, and controlled substance agreement signed & witnessed.    Medications:   New Medications Ordered This Visit   Medications    amphetamine-dextroamphetamine XR (ADDERALL XR) 10 MG 24 hr capsule     Sig: Take 1 capsule by mouth Every Morning     Dispense:  30 capsule     Refill:  0    venlafaxine XR (EFFEXOR-XR) 75 MG 24 hr capsule     Sig: Take 1 capsule by mouth Daily.     Dispense:  30 capsule     Refill:  2      GABY reviewed.   Discussed medication options and treatment plan of prescribed medication as well as the risks, benefits, and side effects.  Patient is agreeable to call the office with any worsening of symptoms or onset of side effects.   Patient is agreeable to call 911 or go to the nearest ER should he/she begin having SI/HI.   Patient acknowledged, is agreeable to continue with current treatment plan, and was educated on the importance of compliance with treatment and follow-up appointments.  Addressed all questions and concerns.     Psychotherapy:    Provided minimal supportive therapy.  Functional status: Serious symptoms OR any serious impairment in social, occupational, or school functioning (41-50)  Prognosis: Fair with expectation for some response to treatment  Progress: worsening      Follow-up: Return in about 3 months (around 1/4/2025).     This document has been electronically signed by VASU Dennison  October 4, 2024 08:48 EDT  Please note that portions of this note were completed  with a voice recognition program.  Copied text in this note has been reviewed and is accurate as of 10/04/24

## 2024-10-04 NOTE — PATIENT INSTRUCTIONS
1.  Please return to clinic at your next scheduled visit.  Please contact the clinic ([phone]) at least 24 hours prior in the event you need to cancel.  2.  Do no harm to yourself or others.    3.  Avoid alcohol and drugs.    4.  Take all medications as prescribed.  Please contact the clinic with any concerns. If you are in need of medication refills, please call the clinic at [phone].    5. Should you want to get in touch with your provider, VASU Dennison, please contact the office ([phone]), and staff will be able to page Abi directly.  6. In the event you have personal crisis, contact the following crisis numbers: Suicide Prevention Hotline 1-707.986.9535; MARK Helpline 7-879-549-AYGL; [facility] Emergency Room [phone]; text HELLO to 192164; or 061.     SPECIFIC RECOMMENDATIONS:     1.      Medications discussed at this encounter:     New Medications Ordered This Visit   Medications    amphetamine-dextroamphetamine XR (ADDERALL XR) 10 MG 24 hr capsule     Sig: Take 1 capsule by mouth Every Morning     Dispense:  30 capsule     Refill:  0    venlafaxine XR (EFFEXOR-XR) 75 MG 24 hr capsule     Sig: Take 1 capsule by mouth Daily.     Dispense:  30 capsule     Refill:  2                       2.      Psychotherapy recommendations: We will continue therapy at future visits.     3.     Return to clinic: Return in about 3 months (around 1/4/2025).

## 2024-10-29 RX ORDER — DEXTROAMPHETAMINE SACCHARATE, AMPHETAMINE ASPARTATE MONOHYDRATE, DEXTROAMPHETAMINE SULFATE AND AMPHETAMINE SULFATE 3.75; 3.75; 3.75; 3.75 MG/1; MG/1; MG/1; MG/1
15 CAPSULE, EXTENDED RELEASE ORAL DAILY
Qty: 30 CAPSULE | Refills: 0 | Status: SHIPPED | OUTPATIENT
Start: 2024-10-29 | End: 2025-10-29

## 2024-10-29 RX ORDER — DEXTROAMPHETAMINE SACCHARATE, AMPHETAMINE ASPARTATE MONOHYDRATE, DEXTROAMPHETAMINE SULFATE AND AMPHETAMINE SULFATE 2.5; 2.5; 2.5; 2.5 MG/1; MG/1; MG/1; MG/1
10 CAPSULE, EXTENDED RELEASE ORAL DAILY
Qty: 30 CAPSULE | Refills: 0 | Status: SHIPPED | OUTPATIENT
Start: 2024-10-29 | End: 2024-10-29 | Stop reason: DRUGHIGH

## 2024-10-29 RX ORDER — DEXTROAMPHETAMINE SACCHARATE, AMPHETAMINE ASPARTATE MONOHYDRATE, DEXTROAMPHETAMINE SULFATE AND AMPHETAMINE SULFATE 3.75; 3.75; 3.75; 3.75 MG/1; MG/1; MG/1; MG/1
15 CAPSULE, EXTENDED RELEASE ORAL DAILY
Qty: 30 CAPSULE | Refills: 0 | Status: SHIPPED | OUTPATIENT
Start: 2024-10-29 | End: 2024-10-29 | Stop reason: SDUPTHER

## 2024-10-29 NOTE — TELEPHONE ENCOUNTER
Called Pt to let her know that her medication had been sent in.  Pt asked that the medication be increased.  I told her that the provider usually does increases at appointments and she said that the provider had just told her to call for an increase as she is having insurance problems and has to schedule her appointment further out.  Provider please advise

## 2024-10-29 NOTE — TELEPHONE ENCOUNTER
PT PHONED IN.    DUE TO THE SHORTAGE SHE WAS ONLY ABLE TO  A QUANTITY OF 26 CAPSULES AT MOST RECENT REFILL.    I CALLED AND SPOKE TO THE PHARMACY.    THEY STATED THAT PT LEGALLY CAN GET ANOTHER REFILL NOW THAT THE 4 TABLETS PT WASN'T ABLE TO GET ARE CONSIDERED LOST.    THEY STATED THEY WOULD BE ABLE TO FILL A 30 DAY SUPPLY TODAY.     ORDER IS PENDED.

## 2024-11-25 DIAGNOSIS — F90.1 ATTENTION DEFICIT HYPERACTIVITY DISORDER (ADHD), PREDOMINANTLY HYPERACTIVE TYPE: ICD-10-CM

## 2024-11-25 RX ORDER — DEXTROAMPHETAMINE SACCHARATE, AMPHETAMINE ASPARTATE MONOHYDRATE, DEXTROAMPHETAMINE SULFATE AND AMPHETAMINE SULFATE 3.75; 3.75; 3.75; 3.75 MG/1; MG/1; MG/1; MG/1
15 CAPSULE, EXTENDED RELEASE ORAL DAILY
Qty: 30 CAPSULE | Refills: 0 | Status: SHIPPED | OUTPATIENT
Start: 2024-11-27 | End: 2025-11-27

## 2024-11-25 NOTE — TELEPHONE ENCOUNTER
CONTROLLED MEDICATION REFILL REQUEST    STATE REGULATION APPT EVERY 3 MONTHS     UDS(URINE DRUG SCREEN) EVERY 6 MONTHS OR UP TO PROVIDER PREFERENCE   (SEGUNDO HERNANDEZ & DANIEL 1 PER YEAR)     NEW NARC CONSENT EVERY YEAR      MEDICATION: amphetamine-dextroamphetamine XR (Adderall XR) 15 MG 24 hr capsule (10/29/2024)     PT(PATIENT) CONFIRMED PHARMACY: [facility] [place]     NEXT OFFICE VISIT: Appointment with Abi Mariee APRN (01/08/2025)     LAST OFFICE VISIT: Telemedicine with Abi Mariee APRN (10/04/2024)     NARC CONSENT: CONTROLLED SUBSTANCE AGREEMENT - SCAN - [facility], [facility], 11/07/2023 (11/07/2023)     URINE DRUG SCREEN(STANDING ORDER)   (SEGUNDO HERNANDEZ & DANIEL 1 PER YEAR): Urine Drug Screen - Urine, Clean Catch (07/03/2024 16:01)     PROVIDER PLEASE ADVISE

## 2024-12-27 DIAGNOSIS — F90.1 ATTENTION DEFICIT HYPERACTIVITY DISORDER (ADHD), PREDOMINANTLY HYPERACTIVE TYPE: ICD-10-CM

## 2024-12-27 RX ORDER — DEXTROAMPHETAMINE SACCHARATE, AMPHETAMINE ASPARTATE MONOHYDRATE, DEXTROAMPHETAMINE SULFATE AND AMPHETAMINE SULFATE 3.75; 3.75; 3.75; 3.75 MG/1; MG/1; MG/1; MG/1
15 CAPSULE, EXTENDED RELEASE ORAL DAILY
Qty: 30 CAPSULE | Refills: 0 | Status: SHIPPED | OUTPATIENT
Start: 2024-12-27 | End: 2025-12-27

## 2025-01-08 ENCOUNTER — TELEMEDICINE (OUTPATIENT)
Dept: BEHAVIORAL HEALTH | Facility: CLINIC | Age: 25
End: 2025-01-08
Payer: MEDICAID

## 2025-01-08 DIAGNOSIS — F33.1 MODERATE EPISODE OF RECURRENT MAJOR DEPRESSIVE DISORDER: Primary | ICD-10-CM

## 2025-01-08 DIAGNOSIS — F41.1 GENERALIZED ANXIETY DISORDER: ICD-10-CM

## 2025-01-08 DIAGNOSIS — F90.1 ATTENTION DEFICIT HYPERACTIVITY DISORDER (ADHD), PREDOMINANTLY HYPERACTIVE TYPE: ICD-10-CM

## 2025-01-08 NOTE — PROGRESS NOTES
[facility]  Medication Management Follow-up  Virtual MyChart Visit  Mode of Visit: Video  Location of patient: -HOME-  Location of provider: [facility] office [address], [address], [place] [address].  You have chosen to receive care through a telehealth visit.  The patient has signed the video visit consent form.  The visit included audio and video interaction. No technical issues occurred during this visit.  Patient is being seen via telehealth and confirm that they are in a secure environment for this session. The patient's condition being diagnosed/treated is appropriate for telemedicine. The provider identified herself as well as her credentials. The electronic data is encrypted and password protected, and the patient has been advised of the potential risks to privacy not withstanding such measures.    Record Review is below for 01/08/2025 :   No current or pertinent labs in record  EKG Results:  None in record  Head Imaging:  None in record  Vital Signs:   There were no vitals taken for this visit.    Chief Complaint: ADHD. Anxiety.     History of Present Illness:   Hina Tucker is a 24 y.o. female who presents today for follow-up and medication management for:    ICD-10-CM ICD-9-CM   1. Moderate episode of recurrent major depressive disorder  F33.1 296.32   2. Generalized anxiety disorder  F41.1 300.02   3. Attention deficit hyperactivity disorder (ADHD), predominantly hyperactive type  F90.1 314.01       01/08/2025 Patient is taking medications as prescribed and is tolerating them well.   Depression and Anxiety  Patient reports she stopped taking effexor XR because she was doing much better, has not been taking for approximately one month. Progression of symptoms, frequency, and intensity is improving. Patient continues to experience excessive anxiety and worry, anxiety, difficulty controlling the worry, and these symptoms are causing significant distress or  "impairment. Patient denies feeling worthless, guilty, hopelessness,. Denies thinking about death and dying, suicidal ideation, planning, or intent to self-harm.  Denies AVH.  Clinically significant distress or impairment in social, occupational, or other important areas of functioning is improving.  ADHD  \"I am doing really good, massive improvements\" Not dreading to do the small tasks, committing to her work, completing tasks. Progression of symptoms, frequency, and intensity is improving. Patient reports improvement in the ability to carry out very short and simple instructions, maintain attention and concentration for extended periods, make simple work-related decisions, and complete a normal workday and workweek without interruptions from psychologically based symptoms. Clinically significant distress or impairment in social, occupational, or other important areas of functioning is improving.    10/04/2024  Depression and Anxiety  \"I am kind drowning\" Has been struggling with insurance coverage and access issues.  Patient has been off medications for several months.  Trying to start school, financial struggles, needs to purchase a car. Progression of symptoms, frequency, and intensity is worsening. Patient continues to experience feelings of sadness, low mood, lost of interest in usual activities, psychomotor agitation, excessive anxiety and worry, anxiety, difficulty controlling the worry, restlessness, feeling keyed up or on edge, irritability, muscle tension, decreased motivation and these symptoms are causing significant distress or impairment. Patient denies feeling worthless, guilty, hopelessness,. Denies thinking about death and dying, suicidal ideation, planning, or intent to self-harm.  Denies AVH.  Clinically significant distress or impairment in social, occupational, or other important areas of functioning is worsening.  ADHD  Progression of symptoms, frequency, and intensity is worsening. Patient " reports worsening in the ability to carry out very short and simple instructions, maintain attention and concentration for extended periods, make simple work-related decisions, and complete a normal workday and workweek without interruptions from psychologically based symptoms. Clinically significant distress or impairment in social, occupational, or other important areas of functioning is worsening.  Restart effexor XR 75 mg daily  Discontinue Azstarys   Start Adderall XR 10 mg daily  Follow-up 3 month    05/28/2024 Patient has not been taking medications for approximately 4 months related to insurance coverage issues.   Depression and Anxiety  Had lost her insurance coverage for an interim time-frame and was unable to have any treatments. Has noticed that she has called in to work a little more often. Progression of symptoms, frequency, and intensity is rapidly worsening. Patient continues to experience feelings of sadness, low mood, lost of interest in usual activities, anhedonia, crying spells, low energy, inability to focus and concentrate that may interfere with daily tasks,  brain fog, excessive anxiety and worry, anxiety, difficulty controlling the worry, restlessness, decreased motivation and these symptoms are causing significant distress or impairment. Patient denies feeling worthless, guilty, hopelessness,. Denies thinking about death and dying, suicidal ideation, planning, or intent to self-harm.  Denies AVH.  Clinically significant distress or impairment in social, occupational, or other important areas of functioning is rapidly worsening.  ADHD  Has not been able to take Azstarys for months related to insurance. Patient reports moderate impairment in the ability to carry out very short and simple instructions, maintain attention and concentration for extended periods, make simple work-related decisions, and complete a normal workday and workweek without interruptions from psychologically based  "symptoms. Symptoms are persistent and significantly interfere with major life activities and/or result in significant suffering.  Restart effexor XR 75 mg daily  Start Azstarys 26.1 mg daily  Follow-up 6 months      02/28/2024 Patient is taking medications as prescribed and is tolerating them well.   \"I am doing really good, I have seen massive improvements in my life\" She is expressing extreme gratitude for the medications and improvement. She has also been able to maintain her job for the first time in a long while.   She is expressing some concern for possible insurance coverage, she is going to check with her HR department about her options.   Depression, anxiety, and ADHD are well-controlled with current medications.   Patient presents with gradual improvement of the following symptoms: anhedonia, decreased concentration, depressed mood, excessive worry, fatigue, feelings of hopelessness, feelings of worthlessness, insomnia, irritability, memory impairment, nervousness/anxiety, psychomotor agitation and restlessness.  Patient is not experiencing: suicidal ideas, suicidal planning and thoughts of death.  Frequency of symptoms: most days   Severity: causing significant distress   Sleep quality: improved, fair  Denies suicidal ideation.  Denies AVH.  We will continue to monitor for mood, behavior, and safety.  Continue effexor XR 75 mg daily  Continue Azstarys 52.3 mg daily  Follow-up 3 months    Record Review is below for 02/28/2024 :   No current or pertinent labs  EKG Results:  None in record  Head Imaging:  None in record    01/22/2024 Patient discontinued Effexor because she was afraid of interaction with her birth control method. We discussed that there have been no known interactions found with her antidepressant. She is going to restart medication because it was helping her. Azstarys is helping, she hasn't missed any work and she would normally have been terminated by now. Has made it full 3 months.  " "  Depression  Patient presents with the following symptoms: anhedonia, decreased concentration, depressed mood, excessive worry, fatigue, feelings of hopelessness, feelings of worthlessness, insomnia, irritability, memory impairment, nervousness/anxiety, psychomotor agitation and restlessness.  Patient is not experiencing: suicidal ideas, suicidal planning and thoughts of death.  Frequency of symptoms: most days   Severity: causing significant distress   Sleep quality: poor  Denies suicidal ideation.  Denies AVH.  We will continue to monitor for mood, behavior, and safety.  Continue effexor XR 75 mg daily  Increase Azstarys 52.3 mg daily  Follow-up 1 month    Record Review is below for 01/22/2024 :   No current or pertinent labs  EKG Results:  None in record  Head Imaging:  None in record    12/12/2023 Patient is taking medications as prescribed and is tolerating them well.   Azstarys is helping with her fidgeting, but brain still feels like a \"scrambled egg\"   She is going to be starting a new job at [employer]. Anxiety symptoms have improved.   Depression  Patient presents with the following symptoms: anhedonia, decreased concentration, depressed mood, excessive worry, fatigue, feelings of hopelessness, feelings of worthlessness, insomnia, irritability, memory impairment, nervousness/anxiety, psychomotor agitation and restlessness.  Patient is not experiencing: suicidal ideas, suicidal planning and thoughts of death.  Frequency of symptoms: most days   Severity: causing significant distress   Sleep quality: poor  Denies suicidal ideation.  Denies AVH.  We will continue to monitor for mood, behavior, and safety.  Continue effexor XR 75 mg daily  Increase Azstarys 39.2 mg daily  Follow-up 1 month    Record Review is below for 12/12/2023 :   No current or pertinent labs  EKG Results:  None in record  Head Imaging:  None in record      11/07/2023 Stopped taking her ADHD medication at age 17 because she didn't think she " "would need it anymore. \"I feel like I can't function in any area of my life\" Struggles with impulsivity, puts her foot in her mouth a lot, she has lost several jobs, trouble with grocery shopping. She is going to therapy weekly.   Depression  Visit Type: initial  Onset of symptoms: more than 5 years ago  Progression since onset: gradually worsening  Patient presents with the following symptoms: anhedonia, decreased concentration, depressed mood, excessive worry, fatigue, feelings of hopelessness, feelings of worthlessness, insomnia, irritability, memory impairment, nervousness/anxiety, psychomotor agitation and restlessness.  Patient is not experiencing: suicidal ideas, suicidal planning and thoughts of death.  Frequency of symptoms: most days   Severity: causing significant distress   Sleep quality: poor  Denies suicidal ideation. Denies AVH.     ADHD:  Symptoms are persistent and significantly interfere with major life activities and/or result in significant suffering  With focus on K5 through 6th grade only   Grades: Poor  Behavioral issues: \"I have gotten in trouble my whole life\"  Special Classes:Denies  Inattention:Yes  Referral for ADHD testing: Diagnosed age 6, Adderall     Often fails to give close attention to details or makes careless mistakes in schoolwork, at work, or during other activities:Yes  Often has difficulty sustaining attention in tasks:Yes  Often does not seem to listen when spoken to directly:Yes  Often does not follow through on instructions and fails to finish duties in the workplace:Yes  Often has difficulty organizing tasks and activities:Yes  Often avoids, dislikes or is reluctant to engage in tasks that require sustained mental effort:Yes  Often loses things necessary for tasks or activities:Yes  Is often easily distracted by extraneous stimuli: Yes  Is often forgetful in daily activities: Yes  Hyperactivity and Impulsivity: Yes  Often fidgets with or taps hands or feet: Yes  Often " leaves seat in situations when remaining seated is expected: Yes  Often feels restless: Yes  Is often “on the go”, acting as if “driven by a motor”: Yes  Often talks excessively: Yes  Often blurts out an answer before a question has been completed: Yes  Often has difficulty waiting their turn: Yes  Often interrupts or intrudes on others: Yes  Continue effexor XR 75 mg daily  Start Azstarys 26.1 mg daily  UDS  CSA  Follow-up 1 month    Record Review for 11/07/2023 : I have thoroughly reviewed the patient's electronic medical record to include previous encounters, care everywhere, notes, medications, labs, GABY and UDS (if applicable), imaging, and EKG's.  Pertinent information is included in this note.  No current or pertinent labs  EKG Results:  None in record  Head Imaging:  None in record    Per Referring Provider 1/17/2023  ADHD : Patient presents for follow-up regarding ADHD.  Recent urine drug screen significant for THC and benzodiazepine.  Discussed during a prior visit here in office.  Patient has been advised referral to psychiatry secondary to complaints regarding anxiety.  She admits to one visit with psychiatry and is now follow-up in March.  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     Past Psychiatric History:  Began Treatment: Age 6  Diagnoses: ADHD, anxiety, ODD,   Psychiatrist: Yes  Therapist: [facility]  Admission History: Several admissions as a child from age [age]  Medication Trials: Adderall, lexapro, zoloft, abilify, seroquel, guanfacine, hydroxyzine, buspar, effexor XR, atomoxetine  Suicide Attempts: Denies  Self Harm: Hx of cutting, 7 years since last incident, superficial scarring joseluis arms, upper thighs  Substance use/abuse:Used to smoke marijuana, she has quit, she thought it was helping her symptoms   Withdrawal Symptoms: Not applicable  Longest Period Sober: Not applicable  AA: Not applicable  Trauma/Childhood/ACE:  Mom suffered with Alcohol use disorder  Access to  Firearms: Denies    Safety/Risk Assessment: Risk of self-harm acutely and chronically is moderate to severe.    Static/Dynamic risk factors include diagnosis of mental disorder, psychosocial stressors,Previous self-harm, Recent stressor or loss, and Social factors.      MENTAL STATUS EXAM   General Appearance:  Cleanly groomed and dressed and well developed  Eye Contact:  Good eye contact  Attitude:  Cooperative and polite  Motor Activity:  Normal gait, posture  Speech:  Normal rate, tone, volume  Mood and affect:  Normal, pleasant and euthymic  Hopelessness:  Denies  Thought Process:  Logical and goal-directed  Associations/ Thought Content:  No delusions  Hallucinations:  None  Suicidal Ideations:  Not present  Homicidal Ideation:  Not present  Sensorium:  Alert  Orientation:  Person, place, time and situation  Immediate Recall, Recent, and Remote Memory:  Intact  Attention Span/ Concentration:  Good  Fund of Knowledge:  Appropriate for age and educational level  Intellectual Functioning:  Average range  Insight:  Good  Judgement:  Good  Reliability:  Good  Impulse Control:  Good       Review of systems is negative except as noted in HPI.  Labs:  WBC   Date Value Ref Range Status   10/12/2021 2.65 (L) 3.40 - 10.80 10*3/mm3 Final     Platelets   Date Value Ref Range Status   10/12/2021 267 140 - 450 10*3/mm3 Final     Hemoglobin   Date Value Ref Range Status   10/12/2021 11.0 (L) 12.0 - 15.9 g/dL Final     Hematocrit   Date Value Ref Range Status   10/12/2021 34.4 34.0 - 46.6 % Final     Glucose   Date Value Ref Range Status   10/12/2021 77 65 - 99 mg/dL Final     Creatinine   Date Value Ref Range Status   10/12/2021 0.61 0.57 - 1.00 mg/dL Final     ALT (SGPT)   Date Value Ref Range Status   10/12/2021 11 1 - 33 U/L Final     AST (SGOT)   Date Value Ref Range Status   10/12/2021 16 1 - 32 U/L Final     BUN   Date Value Ref Range Status   10/12/2021 9 6 - 20 mg/dL Final     Total Cholesterol   Date Value Ref Range  Status   10/12/2021 137 0 - 200 mg/dL Final     Triglycerides   Date Value Ref Range Status   10/12/2021 54 0 - 150 mg/dL Final     HDL Cholesterol   Date Value Ref Range Status   10/12/2021 44 40 - 60 mg/dL Final     LDL Cholesterol    Date Value Ref Range Status   10/12/2021 81 0 - 100 mg/dL Final     VLDL Cholesterol   Date Value Ref Range Status   10/12/2021 12 5 - 40 mg/dL Final     LDL/HDL Ratio   Date Value Ref Range Status   10/12/2021 1.87  Final     Hemoglobin A1C   Date Value Ref Range Status   09/13/2022 5.30 4.80 - 5.60 % Final     TSH   Date Value Ref Range Status   10/12/2021 0.768 0.270 - 4.200 uIU/mL Final      Pain Management Panel  More data exists         Latest Ref Rng & Units 7/3/2024 11/7/2023   Pain Management Panel   Amphetamine, Urine Qual Negative - Negative    Barbiturates Screen, Urine Negative Negative  Negative    Benzodiazepine Screen, Urine Negative Negative  Negative    Buprenorphine, Screen, Urine Negative - Negative    Cocaine Screen, Urine Negative Negative  Negative    Fentanyl, Urine Negative Negative  -   Methadone Screen , Urine Negative Negative  Negative    Methamphetamine, Ur Negative - Negative       Details                  Imaging Results:  No Images in the past 120 days found..  Current Medications:   Current Outpatient Medications   Medication Sig Dispense Refill    amphetamine-dextroamphetamine XR (Adderall XR) 15 MG 24 hr capsule Take 1 capsule by mouth Daily 30 capsule 0    Cholecalciferol (Vitamin D3) 1.25 MG (57818 UT) capsule Take 1 capsule by mouth 1 (One) Time Per Week.      medroxyPROGESTERone (DEPO-PROVERA) 150 MG/ML injection Inject 1 mL into the appropriate muscle as directed by prescriber Every 3 (Three) Months.       No current facility-administered medications for this visit.     Problem List:  Patient Active Problem List   Diagnosis    Anxiety and depression    Attention deficit hyperactivity disorder (ADHD), predominantly inattentive type     Allergy:    Allergies   Allergen Reactions    Amoxicillin Anaphylaxis    Penicillin G Anaphylaxis      Discontinued Medications:  Medications Discontinued During This Encounter   Medication Reason    venlafaxine XR (EFFEXOR-XR) 75 MG 24 hr capsule Patient Reported Not Taking       PLAN:   Presentation seems most consistent with DSM-V criteria for:  Diagnoses and all orders for this visit:    1. Moderate episode of recurrent major depressive disorder (Primary)    2. Generalized anxiety disorder    3. Attention deficit hyperactivity disorder (ADHD), predominantly hyperactive type       Discontinue effexor XR   Continue Adderall XR 15 mg daily  Follow-up 3 month  Medication Education:   ADDERALL (AMPHETAMINE) Risks, benefits, side effects discussed with patient including elevated heart rate, elevated blood pressure, irritability, insomnia, sexual dysfunction, appetite suppressing properties, psychosis.  After discussion of these risks and benefits, the patient voiced understanding and agreed to proceed. Gaby reviewed, UDS ordered, and controlled substance agreement signed & witnessed.  Medications: No orders of the defined types were placed in this encounter.     GABY reviewed.   Discussed medication options and treatment plan of prescribed medication as well as the risks, benefits, and side effects.  Patient is agreeable to call the office with any worsening of symptoms or onset of side effects.   Patient is agreeable to call 911 or go to the nearest ER should he/she begin having SI/HI.   Patient acknowledged, is agreeable to continue with current treatment plan, and was educated on the importance of compliance with treatment and follow-up appointments.  Addressed all questions and concerns.     Psychotherapy:    Provided minimal supportive therapy.  Functional status: If symptoms are present, they are transient and expectable reactions to psychosocial stressors; no more than slight impairment in social, occupational or  school functioning (71-80)  Prognosis: Good chance of responding well to treatment   Progress: improving      Follow-up: Return in about 3 months (around 4/8/2025).     This document has been electronically signed by VASU Dennison  January 8, 2025 08:40 EST  Please note that portions of this note were completed with a voice recognition program.  Copied text in this note has been reviewed and is accurate as of 01/08/25

## 2025-01-08 NOTE — PATIENT INSTRUCTIONS
1.  Please return to clinic at your next scheduled visit.  Please contact the clinic ([phone]) at least 24 hours prior in the event you need to cancel.  2.  Do no harm to yourself or others.    3.  Avoid alcohol and drugs.    4.  Take all medications as prescribed.  Please contact the clinic with any concerns. If you are in need of medication refills, please call the clinic at [phone].    5. Should you want to get in touch with your provider, VASU Dennison, please contact the office ([phone]), and staff will be able to page Abi directly.  6. In the event you have personal crisis, contact the following crisis numbers: Suicide Prevention Hotline 1-943.660.9645; MARK Helpline 4-773-431-MXZV; [facility] Emergency Room [phone]; text HELLO to 076077; or 319.     SPECIFIC RECOMMENDATIONS:     1.      Medications discussed at this encounter:     No orders of the defined types were placed in this encounter.                      2.      Psychotherapy recommendations: We will continue therapy at future visits.     3.     Return to clinic: Return in about 3 months (around 4/8/2025).

## 2025-01-23 DIAGNOSIS — F90.1 ATTENTION DEFICIT HYPERACTIVITY DISORDER (ADHD), PREDOMINANTLY HYPERACTIVE TYPE: Primary | ICD-10-CM

## 2025-01-23 RX ORDER — DEXTROAMPHETAMINE SACCHARATE, AMPHETAMINE ASPARTATE MONOHYDRATE, DEXTROAMPHETAMINE SULFATE AND AMPHETAMINE SULFATE 3.75; 3.75; 3.75; 3.75 MG/1; MG/1; MG/1; MG/1
15 CAPSULE, EXTENDED RELEASE ORAL DAILY
Qty: 30 CAPSULE | Refills: 0 | Status: SHIPPED | OUTPATIENT
Start: 2025-01-25 | End: 2026-01-25

## 2025-01-23 NOTE — TELEPHONE ENCOUNTER
Appointment with Abi Mariee APRN (04/08/2025)     Urine Drug Screen - Urine, Clean Catch (07/03/2024 16:01)   NEW UDS PENDED    CONTROLLED SUBSTANCE AGREEMENT - SCAN - [facility], [facility], 11/07/2023 (11/07/2023)   REMINDER ADDED TO NEXT APPT    Patient needs a refill.  Order pended

## 2025-02-25 DIAGNOSIS — F90.1 ATTENTION DEFICIT HYPERACTIVITY DISORDER (ADHD), PREDOMINANTLY HYPERACTIVE TYPE: ICD-10-CM

## 2025-02-25 NOTE — TELEPHONE ENCOUNTER
Appointment with Abi Mariee APRN (04/08/2025)       Urine Drug Screen - Urine, Clean Catch (07/03/2024 16:01)       CONTROLLED SUBSTANCE AGREEMENT - SCAN - [facility] [place] [facility], 11/07/2023 (11/07/2023)     Patient needs a refill.  Order pended

## 2025-02-26 DIAGNOSIS — F90.1 ATTENTION DEFICIT HYPERACTIVITY DISORDER (ADHD), PREDOMINANTLY HYPERACTIVE TYPE: ICD-10-CM

## 2025-02-27 NOTE — TELEPHONE ENCOUNTER
MEDICATION: amphetamine-dextroamphetamine XR (Adderall XR) 15 MG 24 hr capsule (01/25/2025)     NEXT OFFICE VISIT: Telemedicine with Abi Mariee APRN (01/08/2025)     LAST OFFICE VISIT: Telemedicine with Abi Mariee APRN (10/04/2024)     NARC CONSENT: CONTROLLED SUBSTANCE AGREEMENT - SCAN - [facility], [facility], 11/07/2023 (11/07/2023)     URINE DRUG SCREEN(STANDING ORDER)   (SEGUNDO HERNANDEZ & DANIEL 1 PER YEAR): POC Urine Drug Screen [facility] (11/07/2023 09:42)     PROVIDER PLEASE ADVISE

## 2025-02-28 RX ORDER — DEXTROAMPHETAMINE SACCHARATE, AMPHETAMINE ASPARTATE MONOHYDRATE, DEXTROAMPHETAMINE SULFATE AND AMPHETAMINE SULFATE 3.75; 3.75; 3.75; 3.75 MG/1; MG/1; MG/1; MG/1
15 CAPSULE, EXTENDED RELEASE ORAL DAILY
Qty: 30 CAPSULE | Refills: 0 | OUTPATIENT
Start: 2025-02-28 | End: 2026-02-28

## 2025-02-28 RX ORDER — DEXTROAMPHETAMINE SACCHARATE, AMPHETAMINE ASPARTATE MONOHYDRATE, DEXTROAMPHETAMINE SULFATE AND AMPHETAMINE SULFATE 3.75; 3.75; 3.75; 3.75 MG/1; MG/1; MG/1; MG/1
15 CAPSULE, EXTENDED RELEASE ORAL DAILY
Qty: 30 CAPSULE | Refills: 0 | Status: SHIPPED | OUTPATIENT
Start: 2025-02-28 | End: 2026-02-28

## 2025-03-28 DIAGNOSIS — F90.1 ATTENTION DEFICIT HYPERACTIVITY DISORDER (ADHD), PREDOMINANTLY HYPERACTIVE TYPE: ICD-10-CM

## 2025-03-28 RX ORDER — DEXTROAMPHETAMINE SACCHARATE, AMPHETAMINE ASPARTATE MONOHYDRATE, DEXTROAMPHETAMINE SULFATE AND AMPHETAMINE SULFATE 3.75; 3.75; 3.75; 3.75 MG/1; MG/1; MG/1; MG/1
15 CAPSULE, EXTENDED RELEASE ORAL DAILY
Qty: 30 CAPSULE | Refills: 0 | Status: SHIPPED | OUTPATIENT
Start: 2025-03-29

## 2025-03-28 NOTE — TELEPHONE ENCOUNTER
CONTROLLED MEDICATION REFILL REQUEST    STATE REGULATION APPT EVERY 3 MONTHS     UDS(URINE DRUG SCREEN) EVERY 6 MONTHS OR UP TO PROVIDER PREFERENCE   (SEGUNDO HERNANDEZ & DANIEL 1 PER YEAR)     NEW NARC CONSENT EVERY YEAR      MEDICATION: amphetamine-dextroamphetamine XR (Adderall XR) 15 MG 24 hr capsule (02/28/2025)     PT(PATIENT) REQUESTED A DOSAGE INCREASE     [facility] [place]      NEXT OFFICE VISIT: Appointment with Abi Mariee APRN (04/08/2025)     LAST OFFICE VISIT: Telemedicine with Abi Mariee APRN (01/08/2025)     NARC CONSENT: CONTROLLED SUBSTANCE AGREEMENT - SCAN - [facility], [facility], 11/07/2023 (11/07/2023)     URINE DRUG SCREEN(STANDING ORDER)   (SEGUNDO HERNANDEZ & DANIEL 1 PER YEAR): PT(PATIENT) HAS A PENDED ORDER Urine Drug Screen - Urine, Clean Catch (01/23/2025 11:02)     PT(PATIENT) ADVISED TO COMPLETE ORDER VIA OUTPATIENT LAB SERVICES AT ANY OF THE FOLLOWING [facility] LOCATIONS     [place] [id number]   MON-FRI 830AM-430PM  CLOSED SATURDAY AND SUNDAY   PHONE #[phone]    PROVIDER PLEASE ADVISE

## 2025-04-02 ENCOUNTER — TELEPHONE (OUTPATIENT)
Dept: PSYCHIATRY | Facility: CLINIC | Age: 25
End: 2025-04-02

## 2025-04-02 NOTE — TELEPHONE ENCOUNTER
ATTEMPTED TO CONTACT PT(PATIENT) PER OVERDUE LAB ORDERS PROTOCOL     PT(PATIENT) HAS OVERDUE LAB ORDERS     Urine Drug Screen - Urine, Clean Catch (01/23/2025 11:02)     PT(PATIENT) ADVISED TO COMPLETE ORDER VIA OUTPATIENT LAB SERVICES AT ANY OF THE FOLLOWING [facility] LOCATIONS     [place] [address] [address] [facility] [address]  MON - FRI 7AM-530PM  SAT 8AM-NOON  PHONE #[phone]  FAX#[phone]    [address]   MON-FRI 830AM-430PM  CLOSED SATURDAY AND SUNDAY   PHONE #[phone]    [facility] OUTPATIENT LAB   OPEN M-F 630AM - 5PM   CLOSED SATURDAY AND SUNDAY   [address] [place] [address]   PHONE #[phone]     [facility] OUTPATIENT LAB   [address] [facility]  OPEN M-F 630AM - 5PM   CLOSED SATURDAY AND SUNDAY   PHONE #[phone]     [facility] OUTPATIENT LAB   [address]  MON - FRI 730AM-4PM  CLOSED SATURDAY AND SUNDAY   PHONE #[phone]    [facility] OUTPATIENT LAB   [address] [facility] [address]  MON - FRI 730AM-4PM  CLOSED SATURDAY AND SUNDAY   PHONE #[phone]    [facility] OUTPATIENT LAB (MOVED TO [facility])  [address]   OPEN M-F 6AM - 6PM, SAT 6AM-12PM  PHONE #[phone] OR EXT [phone]  FAX #[phone]   EXT [phone]     NO ANSWER      LEFT VOICEMAIL WITH INSTRUCTIONS TO RETURN CALL TO OFFICE AT [phone]

## 2025-04-08 ENCOUNTER — TELEMEDICINE (OUTPATIENT)
Dept: BEHAVIORAL HEALTH | Facility: CLINIC | Age: 25
End: 2025-04-08
Payer: MEDICAID

## 2025-04-08 DIAGNOSIS — F33.1 MODERATE EPISODE OF RECURRENT MAJOR DEPRESSIVE DISORDER: Primary | ICD-10-CM

## 2025-04-08 DIAGNOSIS — F90.1 ATTENTION DEFICIT HYPERACTIVITY DISORDER (ADHD), PREDOMINANTLY HYPERACTIVE TYPE: ICD-10-CM

## 2025-04-08 DIAGNOSIS — F41.1 GENERALIZED ANXIETY DISORDER: ICD-10-CM

## 2025-04-08 RX ORDER — DEXTROAMPHETAMINE SACCHARATE, AMPHETAMINE ASPARTATE, DEXTROAMPHETAMINE SULFATE AND AMPHETAMINE SULFATE 2.5; 2.5; 2.5; 2.5 MG/1; MG/1; MG/1; MG/1
10 TABLET ORAL
Qty: 30 TABLET | Refills: 0 | Status: SHIPPED | OUTPATIENT
Start: 2025-04-08 | End: 2026-04-08

## 2025-04-08 NOTE — PATIENT INSTRUCTIONS
1.  Please return to clinic at your next scheduled visit.  Please contact the clinic ([phone]) at least 24 hours prior in the event you need to cancel.  2.  Do no harm to yourself or others.    3.  Avoid alcohol and drugs.    4.  Take all medications as prescribed.  Please contact the clinic with any concerns. If you are in need of medication refills, please call the clinic at [phone].    5. Should you want to get in touch with your provider, VASU Dennison, please contact the office ([phone]), and staff will be able to page Abi directly.  6. In the event you have personal crisis, contact the following crisis numbers: Suicide Prevention Hotline 1-815.423.6475; [facility] Helpline 1-630-691-ZVBT; [facility] Emergency Room [phone]; text HELLO to 494531; or 356.     SPECIFIC RECOMMENDATIONS:     1.      Medications discussed at this encounter:     New Medications Ordered This Visit   Medications    amphetamine-dextroamphetamine (Adderall) 10 MG tablet     Sig: Take 1 tablet by mouth Daily With Lunch.     Dispense:  30 tablet     Refill:  0     Booster dose at noon in addition to Adderall XR in am. Thank you.                       2.      Psychotherapy recommendations: We will continue therapy at future visits.     3.     Return to clinic: Return in about 3 months (around 7/8/2025).

## 2025-04-08 NOTE — PROGRESS NOTES
[facility]  Medication Management Follow-up  Virtual MyChart Visit  Mode of Visit: Video  Location of patient: -HOME-  Location of provider: [facility] office [address], [address], [place] [address].  You have chosen to receive care through a telehealth visit.  The patient has signed the video visit consent form.  The visit included audio and video interaction. No technical issues occurred during this visit.  Patient is being seen via telehealth and confirm that they are in a secure environment for this session. The patient's condition being diagnosed/treated is appropriate for telemedicine. The provider identified herself as well as her credentials. The electronic data is encrypted and password protected, and the patient has been advised of the potential risks to privacy not withstanding such measures.    Record Review is below for 04/08/2025 :   No current or pertinent labs in record  EKG Results:  None in record  Head Imaging:  None in record  Vital Signs:   There were no vitals taken for this visit.    Chief Complaint: ADHD. Anxiety.    History of Present Illness:   Hina Tucker is a 24 y.o. female who presents today for follow-up and medication management for:    ICD-10-CM ICD-9-CM   1. Moderate episode of recurrent major depressive disorder  F33.1 296.32   2. Generalized anxiety disorder  F41.1 300.02   3. Attention deficit hyperactivity disorder (ADHD), predominantly hyperactive type  F90.1 314.01       04/08/2025 Patient is taking medications as prescribed and is tolerating them well.   ADHD  Many patients who take long-acting stimulants still need a 'booster' dose of a short-acting agent in the afternoon, to cover late afternoon and early evening activities. Patient reports at current dose the effect is not lasting long enough to cover the entire period that is needed for ADHD support. In this case, we discussed the addition of a shorter-acting version of the  same stimulant agent to cover the hours after the longer-acting agent's effect has waned. Progression of symptoms, frequency, and intensity is waxing and waning. Patient reports waxing and waning in the ability to carry out very short and simple instructions, maintain attention and concentration for extended periods, make simple work-related decisions, and complete a normal workday and workweek without interruptions from psychologically based symptoms. Clinically significant distress or impairment in social, occupational, or other important areas of functioning is waxing and waning.  Depression and Anxiety  Reports this is the longest period that she has ever held a job and she feels like a normal, functioning member of society, planning to attend nursing school again. Progression of symptoms, frequency, and intensity is improving. Patient continues to experience excessive anxiety and worry, anxiety, difficulty controlling the worry, and these symptoms are causing significant distress or impairment. Patient denies low energy, feeling worthless, guilty, hopelessness,. Denies thinking about death and dying, suicidal ideation, planning, or intent to self-harm.  Denies AVH.  Clinically significant distress or impairment in social, occupational, or other important areas of functioning is gradually improving.    01/08/2025 Patient is taking medications as prescribed and is tolerating them well.   Depression and Anxiety  Patient reports she stopped taking effexor XR because she was doing much better, has not been taking for approximately one month. Progression of symptoms, frequency, and intensity is improving. Patient continues to experience excessive anxiety and worry, anxiety, difficulty controlling the worry, and these symptoms are causing significant distress or impairment. Patient denies feeling worthless, guilty, hopelessness,. Denies thinking about death and dying, suicidal ideation, planning, or intent to  "self-harm.  Denies AVH.  Clinically significant distress or impairment in social, occupational, or other important areas of functioning is improving.  ADHD  \"I am doing really good, massive improvements\" Not dreading to do the small tasks, committing to her work, completing tasks. Progression of symptoms, frequency, and intensity is improving. Patient reports improvement in the ability to carry out very short and simple instructions, maintain attention and concentration for extended periods, make simple work-related decisions, and complete a normal workday and workweek without interruptions from psychologically based symptoms. Clinically significant distress or impairment in social, occupational, or other important areas of functioning is improving.    10/04/2024  Depression and Anxiety  \"I am kind drowning\" Has been struggling with insurance coverage and access issues.  Patient has been off medications for several months.  Trying to start school, financial struggles, needs to purchase a car. Progression of symptoms, frequency, and intensity is worsening. Patient continues to experience feelings of sadness, low mood, lost of interest in usual activities, psychomotor agitation, excessive anxiety and worry, anxiety, difficulty controlling the worry, restlessness, feeling keyed up or on edge, irritability, muscle tension, decreased motivation and these symptoms are causing significant distress or impairment. Patient denies feeling worthless, guilty, hopelessness,. Denies thinking about death and dying, suicidal ideation, planning, or intent to self-harm.  Denies AVH.  Clinically significant distress or impairment in social, occupational, or other important areas of functioning is worsening.  ADHD  Progression of symptoms, frequency, and intensity is worsening. Patient reports worsening in the ability to carry out very short and simple instructions, maintain attention and concentration for extended periods, make simple " work-related decisions, and complete a normal workday and workweek without interruptions from psychologically based symptoms. Clinically significant distress or impairment in social, occupational, or other important areas of functioning is worsening.  Restart effexor XR 75 mg daily  Discontinue Azstarys   Start Adderall XR 10 mg daily  Follow-up 3 month    05/28/2024 Patient has not been taking medications for approximately 4 months related to insurance coverage issues.   Depression and Anxiety  Had lost her insurance coverage for an interim time-frame and was unable to have any treatments. Has noticed that she has called in to work a little more often. Progression of symptoms, frequency, and intensity is rapidly worsening. Patient continues to experience feelings of sadness, low mood, lost of interest in usual activities, anhedonia, crying spells, low energy, inability to focus and concentrate that may interfere with daily tasks,  brain fog, excessive anxiety and worry, anxiety, difficulty controlling the worry, restlessness, decreased motivation and these symptoms are causing significant distress or impairment. Patient denies feeling worthless, guilty, hopelessness,. Denies thinking about death and dying, suicidal ideation, planning, or intent to self-harm.  Denies AVH.  Clinically significant distress or impairment in social, occupational, or other important areas of functioning is rapidly worsening.  ADHD  Has not been able to take Azstarys for months related to insurance. Patient reports moderate impairment in the ability to carry out very short and simple instructions, maintain attention and concentration for extended periods, make simple work-related decisions, and complete a normal workday and workweek without interruptions from psychologically based symptoms. Symptoms are persistent and significantly interfere with major life activities and/or result in significant suffering.  Restart effexor XR 75 mg  "daily  Start Azstarys 26.1 mg daily  Follow-up 6 months      02/28/2024 Patient is taking medications as prescribed and is tolerating them well.   \"I am doing really good, I have seen massive improvements in my life\" She is expressing extreme gratitude for the medications and improvement. She has also been able to maintain her job for the first time in a long while.   She is expressing some concern for possible insurance coverage, she is going to check with her HR department about her options.   Depression, anxiety, and ADHD are well-controlled with current medications.   Patient presents with gradual improvement of the following symptoms: anhedonia, decreased concentration, depressed mood, excessive worry, fatigue, feelings of hopelessness, feelings of worthlessness, insomnia, irritability, memory impairment, nervousness/anxiety, psychomotor agitation and restlessness.  Patient is not experiencing: suicidal ideas, suicidal planning and thoughts of death.  Frequency of symptoms: most days   Severity: causing significant distress   Sleep quality: improved, fair  Denies suicidal ideation.  Denies AVH.  We will continue to monitor for mood, behavior, and safety.  Continue effexor XR 75 mg daily  Continue Azstarys 52.3 mg daily  Follow-up 3 months    Record Review is below for 02/28/2024 :   No current or pertinent labs  EKG Results:  None in record  Head Imaging:  None in record    01/22/2024 Patient discontinued Effexor because she was afraid of interaction with her birth control method. We discussed that there have been no known interactions found with her antidepressant. She is going to restart medication because it was helping her. Azstarys is helping, she hasn't missed any work and she would normally have been terminated by now. Has made it full 3 months.    Depression  Patient presents with the following symptoms: anhedonia, decreased concentration, depressed mood, excessive worry, fatigue, feelings of " "hopelessness, feelings of worthlessness, insomnia, irritability, memory impairment, nervousness/anxiety, psychomotor agitation and restlessness.  Patient is not experiencing: suicidal ideas, suicidal planning and thoughts of death.  Frequency of symptoms: most days   Severity: causing significant distress   Sleep quality: poor  Denies suicidal ideation.  Denies AVH.  We will continue to monitor for mood, behavior, and safety.  Continue effexor XR 75 mg daily  Increase Azstarys 52.3 mg daily  Follow-up 1 month    Record Review is below for 01/22/2024 :   No current or pertinent labs  EKG Results:  None in record  Head Imaging:  None in record    12/12/2023 Patient is taking medications as prescribed and is tolerating them well.   Azstarys is helping with her fidgeting, but brain still feels like a \"scrambled egg\"   She is going to be starting a new job at [employer]. Anxiety symptoms have improved.   Depression  Patient presents with the following symptoms: anhedonia, decreased concentration, depressed mood, excessive worry, fatigue, feelings of hopelessness, feelings of worthlessness, insomnia, irritability, memory impairment, nervousness/anxiety, psychomotor agitation and restlessness.  Patient is not experiencing: suicidal ideas, suicidal planning and thoughts of death.  Frequency of symptoms: most days   Severity: causing significant distress   Sleep quality: poor  Denies suicidal ideation.  Denies AVH.  We will continue to monitor for mood, behavior, and safety.  Continue effexor XR 75 mg daily  Increase Azstarys 39.2 mg daily  Follow-up 1 month    Record Review is below for 12/12/2023 :   No current or pertinent labs  EKG Results:  None in record  Head Imaging:  None in record      11/07/2023 Stopped taking her ADHD medication at age 17 because she didn't think she would need it anymore. \"I feel like I can't function in any area of my life\" Struggles with impulsivity, puts her foot in her mouth a lot, she has lost " "several jobs, trouble with grocery shopping. She is going to therapy weekly.   Depression  Visit Type: initial  Onset of symptoms: more than 5 years ago  Progression since onset: gradually worsening  Patient presents with the following symptoms: anhedonia, decreased concentration, depressed mood, excessive worry, fatigue, feelings of hopelessness, feelings of worthlessness, insomnia, irritability, memory impairment, nervousness/anxiety, psychomotor agitation and restlessness.  Patient is not experiencing: suicidal ideas, suicidal planning and thoughts of death.  Frequency of symptoms: most days   Severity: causing significant distress   Sleep quality: poor  Denies suicidal ideation. Denies AVH.     ADHD:  Symptoms are persistent and significantly interfere with major life activities and/or result in significant suffering  With focus on K5 through 6th grade only   Grades: Poor  Behavioral issues: \"I have gotten in trouble my whole life\"  Special Classes:Denies  Inattention:Yes  Referral for ADHD testing: Diagnosed age 6, Adderall     Often fails to give close attention to details or makes careless mistakes in schoolwork, at work, or during other activities:Yes  Often has difficulty sustaining attention in tasks:Yes  Often does not seem to listen when spoken to directly:Yes  Often does not follow through on instructions and fails to finish duties in the workplace:Yes  Often has difficulty organizing tasks and activities:Yes  Often avoids, dislikes or is reluctant to engage in tasks that require sustained mental effort:Yes  Often loses things necessary for tasks or activities:Yes  Is often easily distracted by extraneous stimuli: Yes  Is often forgetful in daily activities: Yes  Hyperactivity and Impulsivity: Yes  Often fidgets with or taps hands or feet: Yes  Often leaves seat in situations when remaining seated is expected: Yes  Often feels restless: Yes  Is often “on the go”, acting as if “driven by a motor”: " Yes  Often talks excessively: Yes  Often blurts out an answer before a question has been completed: Yes  Often has difficulty waiting their turn: Yes  Often interrupts or intrudes on others: Yes  Continue effexor XR 75 mg daily  Start Azstarys 26.1 mg daily  UDS  CSA  Follow-up 1 month    Record Review for 11/07/2023 : I have thoroughly reviewed the patient's electronic medical record to include previous encounters, care everywhere, notes, medications, labs, GABY and UDS (if applicable), imaging, and EKG's.  Pertinent information is included in this note.  No current or pertinent labs  EKG Results:  None in record  Head Imaging:  None in record    Per Referring Provider 1/17/2023  ADHD : Patient presents for follow-up regarding ADHD.  Recent urine drug screen significant for THC and benzodiazepine.  Discussed during a prior visit here in office.  Patient has been advised referral to psychiatry secondary to complaints regarding anxiety.  She admits to one visit with psychiatry and is now follow-up in March.  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     Past Psychiatric History:  Began Treatment: Age 6  Diagnoses: ADHD, anxiety, ODD,   Psychiatrist: Yes  Therapist: [facility]  Admission History: Several admissions as a child from age [age]  Medication Trials: Adderall, lexapro, zoloft, abilify, seroquel, guanfacine, hydroxyzine, buspar, effexor XR, atomoxetine  Suicide Attempts: Denies  Self Harm: Hx of cutting, 7 years since last incident, superficial scarring joseluis arms, upper thighs  Substance use/abuse:Used to smoke marijuana, she has quit, she thought it was helping her symptoms   Withdrawal Symptoms: Not applicable  Longest Period Sober: Not applicable  AA: Not applicable  Trauma/Childhood/ACE:  Mom suffered with Alcohol use disorder  Access to Firearms: Denies    Safety/Risk Assessment: Risk of self-harm acutely and chronically is mild to moderate.    Static/Dynamic risk factors include  diagnosis of mental disorder, psychosocial stressors,Recent stressor or loss and Social factors.      MENTAL STATUS EXAM   General Appearance:  Cleanly groomed and dressed and well developed  Eye Contact:  Good eye contact  Attitude:  Cooperative and polite  Motor Activity:  Normal gait, posture  Speech:  Normal rate, tone, volume  Mood and affect:  Normal, pleasant and euthymic  Hopelessness:  Denies  Thought Process:  Logical and goal-directed  Associations/ Thought Content:  No delusions  Hallucinations:  None  Suicidal Ideations:  Not present  Homicidal Ideation:  Not present  Sensorium:  Alert  Orientation:  Person, place, time and situation  Immediate Recall, Recent, and Remote Memory:  Intact  Attention Span/ Concentration:  Good  Fund of Knowledge:  Appropriate for age and educational level  Intellectual Functioning:  Average range  Insight:  Good  Judgement:  Good  Reliability:  Good  Impulse Control:  Good     PHQ-9 Depression Screening  PHQ-9 Total Score: (Patient-Rptd) 0    Little interest or pleasure in doing things? (Patient-Rptd) Not at all   Feeling down, depressed, or hopeless? (Patient-Rptd) Not at all   PHQ-2 Total Score (Patient-Rptd) 0   Trouble falling or staying asleep, or sleeping too much? (Patient-Rptd) Not at all   Feeling tired or having little energy? (Patient-Rptd) Not at all   Poor appetite or overeating? (Patient-Rptd) Not at all   Feeling bad about yourself - or that you are a failure or have let yourself or your family down? (Patient-Rptd) Not at all   Trouble concentrating on things, such as reading the newspaper or watching television? (Patient-Rptd) Not at all   Moving or speaking so slowly that other people could have noticed? Or the opposite - being so fidgety or restless that you have been moving around a lot more than usual? (Patient-Rptd) Not at all   Thoughts that you would be better off dead, or of hurting yourself in some way? (Patient-Rptd) Not at all   PHQ-9 Total Score  (Patient-Rptd) 0   If you checked off any problems, how difficult have these problems made it for you to do your work, take care of things at home, or get along with other people? (Patient-Rptd) Not difficult at all       ROSIE-7  Feeling nervous, anxious or on edge: (Patient-Rptd) Not at all  Not being able to stop or control worrying: (Patient-Rptd) Not at all  Worrying too much about different things: (Patient-Rptd) Not at all  Trouble Relaxing: (Patient-Rptd) Not at all  Being so restless that it is hard to sit still: (Patient-Rptd) Not at all  Feeling afraid as if something awful might happen: (Patient-Rptd) Not at all  Becoming easily annoyed or irritable: (Patient-Rptd) Not at all  ROSIE 7 Total Score: (Patient-Rptd) 0  If you checked any problems, how difficult have these problems made it for you to do your work, take care of things at home, or get along with other people: (Patient-Rptd) Not difficult at all    Review of systems is negative except as noted in HPI.  Labs:  WBC   Date Value Ref Range Status   10/12/2021 2.65 (L) 3.40 - 10.80 10*3/mm3 Final     Platelets   Date Value Ref Range Status   10/12/2021 267 140 - 450 10*3/mm3 Final     Hemoglobin   Date Value Ref Range Status   10/12/2021 11.0 (L) 12.0 - 15.9 g/dL Final     Hematocrit   Date Value Ref Range Status   10/12/2021 34.4 34.0 - 46.6 % Final     Glucose   Date Value Ref Range Status   10/12/2021 77 65 - 99 mg/dL Final     Creatinine   Date Value Ref Range Status   10/12/2021 0.61 0.57 - 1.00 mg/dL Final     ALT (SGPT)   Date Value Ref Range Status   10/12/2021 11 1 - 33 U/L Final     AST (SGOT)   Date Value Ref Range Status   10/12/2021 16 1 - 32 U/L Final     BUN   Date Value Ref Range Status   10/12/2021 9 6 - 20 mg/dL Final     Total Cholesterol   Date Value Ref Range Status   10/12/2021 137 0 - 200 mg/dL Final     Triglycerides   Date Value Ref Range Status   10/12/2021 54 0 - 150 mg/dL Final     HDL Cholesterol   Date Value Ref Range Status    10/12/2021 44 40 - 60 mg/dL Final     LDL Cholesterol    Date Value Ref Range Status   10/12/2021 81 0 - 100 mg/dL Final     VLDL Cholesterol   Date Value Ref Range Status   10/12/2021 12 5 - 40 mg/dL Final     LDL/HDL Ratio   Date Value Ref Range Status   10/12/2021 1.87  Final     Hemoglobin A1C   Date Value Ref Range Status   09/13/2022 5.30 4.80 - 5.60 % Final     TSH   Date Value Ref Range Status   10/12/2021 0.768 0.270 - 4.200 uIU/mL Final      Pain Management Panel  More data exists         Latest Ref Rng & Units 7/3/2024 11/7/2023   Pain Management Panel   Amphetamine, Urine Qual Negative - Negative    Barbiturates Screen, Urine Negative Negative  Negative    Benzodiazepine Screen, Urine Negative Negative  Negative    Buprenorphine, Screen, Urine Negative - Negative    Cocaine Screen, Urine Negative Negative  Negative    Fentanyl, Urine Negative Negative  -   Methadone Screen , Urine Negative Negative  Negative    Methamphetamine, Ur Negative - Negative       Imaging Results:  No Images in the past 120 days found..  Current Medications:   Current Outpatient Medications   Medication Sig Dispense Refill    amphetamine-dextroamphetamine XR (Adderall XR) 15 MG 24 hr capsule Take 1 capsule by mouth Daily 30 capsule 0    amphetamine-dextroamphetamine (Adderall) 10 MG tablet Take 1 tablet by mouth Daily With Lunch. 30 tablet 0    Cholecalciferol (Vitamin D3) 1.25 MG (80695 UT) capsule Take 1 capsule by mouth 1 (One) Time Per Week.      medroxyPROGESTERone (DEPO-PROVERA) 150 MG/ML injection Inject 1 mL into the appropriate muscle as directed by prescriber Every 3 (Three) Months.       No current facility-administered medications for this visit.     Problem List:  Patient Active Problem List   Diagnosis    Anxiety and depression    Attention deficit hyperactivity disorder (ADHD), predominantly inattentive type     Allergy:   Allergies   Allergen Reactions    Amoxicillin Anaphylaxis    Penicillin G Anaphylaxis       Discontinued Medications:  There are no discontinued medications.    PLAN:   Presentation meets DSM-V criteria for:  Diagnoses and all orders for this visit:    1. Moderate episode of recurrent major depressive disorder (Primary)    2. Generalized anxiety disorder    3. Attention deficit hyperactivity disorder (ADHD), predominantly hyperactive type  -     amphetamine-dextroamphetamine (Adderall) 10 MG tablet; Take 1 tablet by mouth Daily With Lunch.  Dispense: 30 tablet; Refill: 0       Continue Adderall XR 15 mg daily  Start Adderall IR 10 mg daily  Follow-up 3 month  Medication Education:   ADDERALL (AMPHETAMINE) Risks, benefits, side effects discussed with patient including elevated heart rate, elevated blood pressure, irritability, insomnia, sexual dysfunction, appetite suppressing properties, psychosis.  After discussion of these risks and benefits, the patient voiced understanding and agreed to proceed. Gaby reviewed, UDS ordered, and controlled substance agreement signed & witnessed.  Medications:   New Medications Ordered This Visit   Medications    amphetamine-dextroamphetamine (Adderall) 10 MG tablet     Sig: Take 1 tablet by mouth Daily With Lunch.     Dispense:  30 tablet     Refill:  0     Booster dose at noon in addition to Adderall XR in am. Thank you.      GABY reviewed.   Discussed medication options and treatment plan of prescribed medication as well as the risks, benefits, and side effects.  Patient is agreeable to call the office with any worsening of symptoms or onset of side effects.   Patient is agreeable to call 911 or go to the nearest ER should he/she begin having SI/HI.   Patient acknowledged, is agreeable to continue with current treatment plan, and was educated on the importance of compliance with treatment and follow-up appointments.  Addressed all questions and concerns.     Psychotherapy:    Provided minimal supportive therapy.  Functional status: Moderate symptoms OR moderate  difficulty in social occupational or social functioning (51-60)  Prognosis: Good chance of responding well to treatment   Progress: gradually improving      Follow-up: Return in about 3 months (around 7/8/2025).     This document has been electronically signed by VASU Dennison  April 8, 2025 08:29 EDT  Please note that portions of this note were completed with a voice recognition program.  Copied text in this note has been reviewed and is accurate as of 04/08/25

## 2025-04-28 DIAGNOSIS — F90.1 ATTENTION DEFICIT HYPERACTIVITY DISORDER (ADHD), PREDOMINANTLY HYPERACTIVE TYPE: ICD-10-CM

## 2025-04-28 NOTE — TELEPHONE ENCOUNTER
CONTROLLED MEDICATION REFILL REQUEST    STATE REGULATION APPT EVERY 3 MONTHS     UDS(URINE DRUG SCREEN) EVERY 6 MONTHS OR UP TO PROVIDER PREFERENCE   (DE DAVID & SANCHEZ 1 PER YEAR)     NEW NARC CONSENT EVERY YEAR      MEDICATION: amphetamine-dextroamphetamine XR (Adderall XR) 15 MG 24 hr capsule (03/29/2025)  amphetamine-dextroamphetamine (Adderall) 10 MG tablet (04/08/2025)    PT(PATIENT) CONFIRMED PHARMACY: [facility]      NEXT OFFICE VISIT: Appointment with Abi Mariee APRN (07/08/2025)     LAST OFFICE VISIT: Telemedicine with Abi Mariee APRN (04/08/2025)     NARC CONSENT: CONTROLLED SUBSTANCE AGREEMENT - SCAN - [facility], [facility], 11/07/2023 (11/07/2023)     URINE DRUG SCREEN(STANDING ORDER)   (DE DAVID & SANCHEZ 1 PER YEAR): PT(PATIENT) HAS A PENDED ORDER Urine Drug Screen - Urine, Clean Catch (01/23/2025 11:02)     PROVIDER PLEASE ADVISE

## 2025-04-29 RX ORDER — DEXTROAMPHETAMINE SACCHARATE, AMPHETAMINE ASPARTATE MONOHYDRATE, DEXTROAMPHETAMINE SULFATE AND AMPHETAMINE SULFATE 3.75; 3.75; 3.75; 3.75 MG/1; MG/1; MG/1; MG/1
15 CAPSULE, EXTENDED RELEASE ORAL DAILY
Qty: 30 CAPSULE | Refills: 0 | Status: SHIPPED | OUTPATIENT
Start: 2025-04-29

## 2025-05-02 ENCOUNTER — TELEPHONE (OUTPATIENT)
Dept: PSYCHIATRY | Facility: CLINIC | Age: 25
End: 2025-05-02
Payer: COMMERCIAL

## 2025-05-02 NOTE — TELEPHONE ENCOUNTER
RECEIVED FAX FROM [facility] OF [facility] UDS RESULTS.     BEHAVIORAL HEALTH - SCAN - UDS RESULTS; [facility]; 05/02/2025 (05/02/2025)

## 2025-05-29 DIAGNOSIS — F90.1 ATTENTION DEFICIT HYPERACTIVITY DISORDER (ADHD), PREDOMINANTLY HYPERACTIVE TYPE: ICD-10-CM

## 2025-05-29 NOTE — TELEPHONE ENCOUNTER
Appointment with Abi Mariee APRN (07/08/2025)     Urine Drug Screen - Urine, Clean Catch (07/03/2024 16:01)     CONTROLLED SUBSTANCE AGREEMENT - SCAN - CSA, [facility], 11/07/2023 (11/07/2023)     PT NEEDS NEW CSA.  REMINDWER ADDED TO NEXT APPT    Patient needs a refill.  Order pended TO [facility] IN [place]

## 2025-05-30 NOTE — TELEPHONE ENCOUNTER
PT CALLED TO FOLLOW UP.  STATES THAT SHE IS GETTING A  LITTLE WORRIED BECAUSE OF THE WEEKEND APPROACHING

## 2025-06-02 RX ORDER — DEXTROAMPHETAMINE SACCHARATE, AMPHETAMINE ASPARTATE MONOHYDRATE, DEXTROAMPHETAMINE SULFATE AND AMPHETAMINE SULFATE 3.75; 3.75; 3.75; 3.75 MG/1; MG/1; MG/1; MG/1
15 CAPSULE, EXTENDED RELEASE ORAL DAILY
Qty: 30 CAPSULE | Refills: 0 | Status: SHIPPED | OUTPATIENT
Start: 2025-06-02

## 2025-06-04 RX ORDER — DEXTROAMPHETAMINE SACCHARATE, AMPHETAMINE ASPARTATE, DEXTROAMPHETAMINE SULFATE AND AMPHETAMINE SULFATE 2.5; 2.5; 2.5; 2.5 MG/1; MG/1; MG/1; MG/1
10 TABLET ORAL
Qty: 30 TABLET | Refills: 0 | Status: SHIPPED | OUTPATIENT
Start: 2025-06-05 | End: 2025-06-05 | Stop reason: SDUPTHER

## 2025-06-04 NOTE — TELEPHONE ENCOUNTER
MEDICATION: amphetamine-dextroamphetamine (Adderall) 10 MG tablet (05/07/2025)     NEXT OFFICE VISIT: Appointment with Abi Mariee APRN (07/08/2025)     LAST OFFICE VISIT: Telemedicine with Abi Mariee APRN (04/08/2025)     NARC CONSENT: CONTROLLED SUBSTANCE AGREEMENT - SCAN - [facility], 11/07/2023 (11/07/2023)       PROVIDER PLEASE ADVISE

## 2025-06-04 NOTE — TELEPHONE ENCOUNTER
PT(PATIENT) VERIFIED     PT(PATIENT) ALSO NEEDS A REFILL OF   amphetamine-dextroamphetamine (Adderall) 10 MG tablet (2025)     PLEASE ADVISE

## 2025-06-05 RX ORDER — DEXTROAMPHETAMINE SACCHARATE, AMPHETAMINE ASPARTATE, DEXTROAMPHETAMINE SULFATE AND AMPHETAMINE SULFATE 2.5; 2.5; 2.5; 2.5 MG/1; MG/1; MG/1; MG/1
10 TABLET ORAL
Qty: 30 TABLET | Refills: 0 | Status: SHIPPED | OUTPATIENT
Start: 2025-06-05 | End: 2026-06-05

## 2025-06-05 NOTE — TELEPHONE ENCOUNTER
PT PHONED IN.    DUE TO SHORTAGE SHE NEEDS ONLY HER ADDERALL 10 MG TABLETS SENT TO ANOTHER PHARMACY.     I HAVE PENDED THE ORDER WITH THE NEW REQUESTED PHARMACY FOR THIS ONE TIME.

## 2025-07-01 DIAGNOSIS — F90.1 ATTENTION DEFICIT HYPERACTIVITY DISORDER (ADHD), PREDOMINANTLY HYPERACTIVE TYPE: ICD-10-CM

## 2025-07-01 RX ORDER — DEXTROAMPHETAMINE SACCHARATE, AMPHETAMINE ASPARTATE MONOHYDRATE, DEXTROAMPHETAMINE SULFATE AND AMPHETAMINE SULFATE 3.75; 3.75; 3.75; 3.75 MG/1; MG/1; MG/1; MG/1
15 CAPSULE, EXTENDED RELEASE ORAL DAILY
Qty: 30 CAPSULE | Refills: 0 | Status: SHIPPED | OUTPATIENT
Start: 2025-07-01 | End: 2025-07-01 | Stop reason: SDUPTHER

## 2025-07-01 RX ORDER — DEXTROAMPHETAMINE SACCHARATE, AMPHETAMINE ASPARTATE MONOHYDRATE, DEXTROAMPHETAMINE SULFATE AND AMPHETAMINE SULFATE 3.75; 3.75; 3.75; 3.75 MG/1; MG/1; MG/1; MG/1
15 CAPSULE, EXTENDED RELEASE ORAL DAILY
Qty: 30 CAPSULE | Refills: 0 | Status: SHIPPED | OUTPATIENT
Start: 2025-07-01

## 2025-07-01 NOTE — TELEPHONE ENCOUNTER
Appointment with Abi Mariee APRN (07/08/2025)       Urine Drug Screen - Urine, Clean Catch (07/03/2024 16:01)     CONTROLLED SUBSTANCE AGREEMENT - SCAN - CSA, [facility], 11/07/2023 (11/07/2023)     Pt needs new csa at appointment      Pended to [facility] in [place] per pt request.

## 2025-07-01 NOTE — TELEPHONE ENCOUNTER
PT NEEDS ADDERALL XR ROUTED OVER TO [facility] IN [place] DUE TO SHORTAGE.    ORDER PENDED TO [facility].

## 2025-07-08 ENCOUNTER — TELEMEDICINE (OUTPATIENT)
Dept: BEHAVIORAL HEALTH | Facility: CLINIC | Age: 25
End: 2025-07-08
Payer: COMMERCIAL

## 2025-07-08 DIAGNOSIS — F90.1 ATTENTION DEFICIT HYPERACTIVITY DISORDER (ADHD), PREDOMINANTLY HYPERACTIVE TYPE: Primary | ICD-10-CM

## 2025-07-08 DIAGNOSIS — F41.1 GENERALIZED ANXIETY DISORDER: ICD-10-CM

## 2025-07-08 DIAGNOSIS — F33.1 MODERATE EPISODE OF RECURRENT MAJOR DEPRESSIVE DISORDER: ICD-10-CM

## 2025-07-08 NOTE — PROGRESS NOTES
[facility] [place] [facility]  Medication Management Follow-up  Virtual MyChart Visit  Mode of Visit: Video  Location of patient: -HOME-  Location of provider: [facility] office [address], [address], [place] [address].  You have chosen to receive care through a telehealth visit.  The patient has signed the video visit consent form.  The visit included audio and video interaction. No technical issues occurred during this visit.  Patient is being seen via telehealth and confirm that they are in a secure environment for this session. The patient's condition being diagnosed/treated is appropriate for telemedicine. The provider identified herself as well as her credentials. The electronic data is encrypted and password protected, and the patient has been advised of the potential risks to privacy not withstanding such measures.    Record Review is below for 07/08/2025 :   Urine Drug Screen - Urine, Clean Catch (07/03/2024 16:01)  EKG Results:  No current or pertinent labs in record  Head Imaging:  No current or pertinent labs in record  Vital Signs:   There were no vitals taken for this visit.    Chief Complaint: ADHD. Depression. Anxiety.     History of Present Illness:   Hina Tucker is a 25 y.o. female who presents today for follow-up and medication management for:    ICD-10-CM ICD-9-CM   1. Attention deficit hyperactivity disorder (ADHD), predominantly hyperactive type  F90.1 314.01   2. Moderate episode of recurrent major depressive disorder  F33.1 296.32   3. Generalized anxiety disorder  F41.1 300.02       07/08/2025 Patient is taking medications as prescribed and is tolerating them well.   ADHD  Reports the booster dose of Adderall is helping her complete her workday. Progression of symptoms, frequency, and intensity is improving. Patient reports improvement in the ability to carry out very short and simple instructions, maintain attention and concentration for extended periods, make  "simple work-related decisions, and complete a normal workday and workweek without interruptions from psychologically based symptoms. Clinically significant distress or impairment in social, occupational, or other important areas of functioning is improving.  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 Progression of symptoms, frequency, and intensity is improving. Patient continues to experience feelings of sadness, low mood, lost of interest in usual activities, excessive anxiety and worry, anxiety, difficulty controlling the worry, restlessness, feeling keyed up or on edge, and these symptoms are causing significant distress or impairment. Patient denies feeling worthless, guilty, hopelessness,. Denies thinking about death and dying, suicidal ideation, planning, or intent to self-harm.  Denies AVH.  Clinically significant distress or impairment in social, occupational, or other important areas of functioning is gradually improving.  PHQ-9 is 0 and ROSIE-7 is 0 however both are incongruent, patient states that over the last 2 weeks she has experienced some symptoms but does not wish to target with medications such as antidepressants at this time.    04/08/2025 Patient is taking medications as prescribed and is tolerating them well.   ADHD  Many patients who take long-acting stimulants still need a 'booster' dose of a short-acting agent in the afternoon, to cover late afternoon and early evening activities. Patient reports at current dose the effect is not lasting long enough to cover the entire period that is needed for ADHD support. In this case, we discussed the addition of a shorter-acting version of the same stimulant agent to cover the hours after the longer-acting agent's effect has waned. Progression of symptoms, frequency, and intensity is waxing and waning. " Patient reports waxing and waning in the ability to carry out very short and simple instructions, maintain attention and concentration for extended periods, make simple work-related decisions, and complete a normal workday and workweek without interruptions from psychologically based symptoms. Clinically significant distress or impairment in social, occupational, or other important areas of functioning is waxing and waning.  Depression and Anxiety  Reports this is the longest period that she has ever held a job and she feels like a normal, functioning member of society, planning to attend nursing school again. Progression of symptoms, frequency, and intensity is improving. Patient continues to experience excessive anxiety and worry, anxiety, difficulty controlling the worry, and these symptoms are causing significant distress or impairment. Patient denies low energy, feeling worthless, guilty, hopelessness,. Denies thinking about death and dying, suicidal ideation, planning, or intent to self-harm.  Denies AVH.  Clinically significant distress or impairment in social, occupational, or other important areas of functioning is gradually improving.  Continue Adderall XR 15 mg daily  Start Adderall IR 10 mg daily  Follow-up 3 month    01/08/2025 Patient is taking medications as prescribed and is tolerating them well.   Depression and Anxiety  Patient reports she stopped taking effexor XR because she was doing much better, has not been taking for approximately one month. Progression of symptoms, frequency, and intensity is improving. Patient continues to experience excessive anxiety and worry, anxiety, difficulty controlling the worry, and these symptoms are causing significant distress or impairment. Patient denies feeling worthless, guilty, hopelessness,. Denies thinking about death and dying, suicidal ideation, planning, or intent to self-harm.  Denies AVH.  Clinically significant distress or impairment in social,  "occupational, or other important areas of functioning is improving.  ADHD  \"I am doing really good, massive improvements\" Not dreading to do the small tasks, committing to her work, completing tasks. Progression of symptoms, frequency, and intensity is improving. Patient reports improvement in the ability to carry out very short and simple instructions, maintain attention and concentration for extended periods, make simple work-related decisions, and complete a normal workday and workweek without interruptions from psychologically based symptoms. Clinically significant distress or impairment in social, occupational, or other important areas of functioning is improving.    10/04/2024  Depression and Anxiety  \"I am kind drowning\" Has been struggling with insurance coverage and access issues.  Patient has been off medications for several months.  Trying to start school, financial struggles, needs to purchase a car. Progression of symptoms, frequency, and intensity is worsening. Patient continues to experience feelings of sadness, low mood, lost of interest in usual activities, psychomotor agitation, excessive anxiety and worry, anxiety, difficulty controlling the worry, restlessness, feeling keyed up or on edge, irritability, muscle tension, decreased motivation and these symptoms are causing significant distress or impairment. Patient denies feeling worthless, guilty, hopelessness,. Denies thinking about death and dying, suicidal ideation, planning, or intent to self-harm.  Denies AVH.  Clinically significant distress or impairment in social, occupational, or other important areas of functioning is worsening.  ADHD  Progression of symptoms, frequency, and intensity is worsening. Patient reports worsening in the ability to carry out very short and simple instructions, maintain attention and concentration for extended periods, make simple work-related decisions, and complete a normal workday and workweek without " interruptions from psychologically based symptoms. Clinically significant distress or impairment in social, occupational, or other important areas of functioning is worsening.  Restart effexor XR 75 mg daily  Discontinue Azstarys   Start Adderall XR 10 mg daily  Follow-up 3 month    05/28/2024 Patient has not been taking medications for approximately 4 months related to insurance coverage issues.   Depression and Anxiety  Had lost her insurance coverage for an interim time-frame and was unable to have any treatments. Has noticed that she has called in to work a little more often. Progression of symptoms, frequency, and intensity is rapidly worsening. Patient continues to experience feelings of sadness, low mood, lost of interest in usual activities, anhedonia, crying spells, low energy, inability to focus and concentrate that may interfere with daily tasks,  brain fog, excessive anxiety and worry, anxiety, difficulty controlling the worry, restlessness, decreased motivation and these symptoms are causing significant distress or impairment. Patient denies feeling worthless, guilty, hopelessness,. Denies thinking about death and dying, suicidal ideation, planning, or intent to self-harm.  Denies AVH.  Clinically significant distress or impairment in social, occupational, or other important areas of functioning is rapidly worsening.  ADHD  Has not been able to take Azstarys for months related to insurance. Patient reports moderate impairment in the ability to carry out very short and simple instructions, maintain attention and concentration for extended periods, make simple work-related decisions, and complete a normal workday and workweek without interruptions from psychologically based symptoms. Symptoms are persistent and significantly interfere with major life activities and/or result in significant suffering.  Restart effexor XR 75 mg daily  Start Azstarys 26.1 mg daily  Follow-up 6 months      02/28/2024 Patient  "is taking medications as prescribed and is tolerating them well.   \"I am doing really good, I have seen massive improvements in my life\" She is expressing extreme gratitude for the medications and improvement. She has also been able to maintain her job for the first time in a long while.   She is expressing some concern for possible insurance coverage, she is going to check with her HR department about her options.   Depression, anxiety, and ADHD are well-controlled with current medications.   Patient presents with gradual improvement of the following symptoms: anhedonia, decreased concentration, depressed mood, excessive worry, fatigue, feelings of hopelessness, feelings of worthlessness, insomnia, irritability, memory impairment, nervousness/anxiety, psychomotor agitation and restlessness.  Patient is not experiencing: suicidal ideas, suicidal planning and thoughts of death.  Frequency of symptoms: most days   Severity: causing significant distress   Sleep quality: improved, fair  Denies suicidal ideation.  Denies AVH.  We will continue to monitor for mood, behavior, and safety.  Continue effexor XR 75 mg daily  Continue Azstarys 52.3 mg daily  Follow-up 3 months    Record Review is below for 02/28/2024 :   No current or pertinent labs  EKG Results:  None in record  Head Imaging:  None in record    01/22/2024 Patient discontinued Effexor because she was afraid of interaction with her birth control method. We discussed that there have been no known interactions found with her antidepressant. She is going to restart medication because it was helping her. Azstarys is helping, she hasn't missed any work and she would normally have been terminated by now. Has made it full 3 months.    Depression  Patient presents with the following symptoms: anhedonia, decreased concentration, depressed mood, excessive worry, fatigue, feelings of hopelessness, feelings of worthlessness, insomnia, irritability, memory impairment, " "nervousness/anxiety, psychomotor agitation and restlessness.  Patient is not experiencing: suicidal ideas, suicidal planning and thoughts of death.  Frequency of symptoms: most days   Severity: causing significant distress   Sleep quality: poor  Denies suicidal ideation.  Denies AVH.  We will continue to monitor for mood, behavior, and safety.  Continue effexor XR 75 mg daily  Increase Azstarys 52.3 mg daily  Follow-up 1 month    Record Review is below for 01/22/2024 :   No current or pertinent labs  EKG Results:  None in record  Head Imaging:  None in record    12/12/2023 Patient is taking medications as prescribed and is tolerating them well.   Azstarys is helping with her fidgeting, but brain still feels like a \"scrambled egg\"   She is going to be starting a new job at [employer]. Anxiety symptoms have improved.   Depression  Patient presents with the following symptoms: anhedonia, decreased concentration, depressed mood, excessive worry, fatigue, feelings of hopelessness, feelings of worthlessness, insomnia, irritability, memory impairment, nervousness/anxiety, psychomotor agitation and restlessness.  Patient is not experiencing: suicidal ideas, suicidal planning and thoughts of death.  Frequency of symptoms: most days   Severity: causing significant distress   Sleep quality: poor  Denies suicidal ideation.  Denies AVH.  We will continue to monitor for mood, behavior, and safety.  Continue effexor XR 75 mg daily  Increase Azstarys 39.2 mg daily  Follow-up 1 month    Record Review is below for 12/12/2023 :   No current or pertinent labs  EKG Results:  None in record  Head Imaging:  None in record      11/07/2023 Stopped taking her ADHD medication at age 17 because she didn't think she would need it anymore. \"I feel like I can't function in any area of my life\" Struggles with impulsivity, puts her foot in her mouth a lot, she has lost several jobs, trouble with grocery shopping. She is going to therapy weekly. " "  Depression  Visit Type: initial  Onset of symptoms: more than 5 years ago  Progression since onset: gradually worsening  Patient presents with the following symptoms: anhedonia, decreased concentration, depressed mood, excessive worry, fatigue, feelings of hopelessness, feelings of worthlessness, insomnia, irritability, memory impairment, nervousness/anxiety, psychomotor agitation and restlessness.  Patient is not experiencing: suicidal ideas, suicidal planning and thoughts of death.  Frequency of symptoms: most days   Severity: causing significant distress   Sleep quality: poor  Denies suicidal ideation. Denies AVH.     ADHD:  Symptoms are persistent and significantly interfere with major life activities and/or result in significant suffering  With focus on K5 through 6th grade only   Grades: Poor  Behavioral issues: \"I have gotten in trouble my whole life\"  Special Classes:Denies  Inattention:Yes  Referral for ADHD testing: Diagnosed age 6, Adderall     Often fails to give close attention to details or makes careless mistakes in schoolwork, at work, or during other activities:Yes  Often has difficulty sustaining attention in tasks:Yes  Often does not seem to listen when spoken to directly:Yes  Often does not follow through on instructions and fails to finish duties in the workplace:Yes  Often has difficulty organizing tasks and activities:Yes  Often avoids, dislikes or is reluctant to engage in tasks that require sustained mental effort:Yes  Often loses things necessary for tasks or activities:Yes  Is often easily distracted by extraneous stimuli: Yes  Is often forgetful in daily activities: Yes  Hyperactivity and Impulsivity: Yes  Often fidgets with or taps hands or feet: Yes  Often leaves seat in situations when remaining seated is expected: Yes  Often feels restless: Yes  Is often “on the go”, acting as if “driven by a motor”: Yes  Often talks excessively: Yes  Often blurts out an answer before a question has " been completed: Yes  Often has difficulty waiting their turn: Yes  Often interrupts or intrudes on others: Yes  Continue effexor XR 75 mg daily  Start Azstarys 26.1 mg daily  UDS  CSA  Follow-up 1 month    Record Review for 11/07/2023 : I have thoroughly reviewed the patient's electronic medical record to include previous encounters, care everywhere, notes, medications, labs, GABY and UDS (if applicable), imaging, and EKG's.  Pertinent information is included in this note.  No current or pertinent labs  EKG Results:  None in record  Head Imaging:  None in record    Per Referring Provider 1/17/2023  ADHD : Patient presents for follow-up regarding ADHD.  Recent urine drug screen significant for THC and benzodiazepine.  Discussed during a prior visit here in office.  Patient has been advised referral to psychiatry secondary to complaints regarding anxiety.  She admits to one visit with psychiatry and is now follow-up in March.  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     Past Psychiatric History:  Began Treatment: Age 6  Diagnoses: ADHD, anxiety, ODD,   Psychiatrist: Yes  [facility]  Ad[facility] History: Several admissions as a child from age [age]  Medication Trials: Adderall, lexapro, zoloft, abilify, seroquel, guanfacine, hydroxyzine, buspar, effexor XR, atomoxetine  Suicide Attempts: Denies  Self Harm: Hx of cutting, 7 years since last incident, superficial scarring joselius arms, upper thighs  Substance use/abuse:Used to smoke marijuana, she has quit, she thought it was helping her symptoms   Withdrawal Symptoms: Not applicable  Longest Period Sober: Not applicable  AA: Not applicable  Trauma/Childhood/ACE:  Mom suffered with Alcohol use disorder  Access to Firearms: Denies    Safety/Risk Assessment: Risk of self-harm acutely and chronically is moderate to severe.    Static/Dynamic risk factors include diagnosis of mental disorder, psychosocial stressors,Previous self-harm, Recent stressor  or loss, and Social factors.      MENTAL STATUS EXAM   General Appearance:  Cleanly groomed and dressed and well developed  Eye Contact:  Good eye contact  Attitude:  Cooperative and polite  Motor Activity:  Normal gait, posture  Speech:  Normal rate, tone, volume  Mood and affect:  Normal, pleasant and euthymic  Hopelessness:  Denies  Thought Process:  Logical and goal-directed  Associations/ Thought Content:  No delusions  Hallucinations:  None  Suicidal Ideations:  Not present  Homicidal Ideation:  Not present  Sensorium:  Alert  Orientation:  Person, place, time and situation  Immediate Recall, Recent, and Remote Memory:  Intact  Attention Span/ Concentration:  Good  Fund of Knowledge:  Appropriate for age and educational level  Intellectual Functioning:  Average range  Insight:  Good  Judgement:  Good  Reliability:  Good  Impulse Control:  Good     PHQ-9 Depression Screening  PHQ-9 Total Score: (Patient-Rptd) 0    Little interest or pleasure in doing things? (Patient-Rptd) Not at all   Feeling down, depressed, or hopeless? (Patient-Rptd) Not at all   PHQ-2 Total Score (Patient-Rptd) 0   Trouble falling or staying asleep, or sleeping too much? (Patient-Rptd) Not at all   Feeling tired or having little energy? (Patient-Rptd) Not at all   Poor appetite or overeating? (Patient-Rptd) Not at all   Feeling bad about yourself - or that you are a failure or have let yourself or your family down? (Patient-Rptd) Not at all   Trouble concentrating on things, such as reading the newspaper or watching television? (Patient-Rptd) Not at all   Moving or speaking so slowly that other people could have noticed? Or the opposite - being so fidgety or restless that you have been moving around a lot more than usual? (Patient-Rptd) Not at all   Thoughts that you would be better off dead, or of hurting yourself in some way? (Patient-Rptd) Not at all   PHQ-9 Total Score (Patient-Rptd) 0   If you checked off any problems, how difficult  have these problems made it for you to do your work, take care of things at home, or get along with other people? (Patient-Rptd) Not difficult at all       ROSIE-7  Feeling nervous, anxious or on edge: (Patient-Rptd) Not at all  Not being able to stop or control worrying: (Patient-Rptd) Not at all  Worrying too much about different things: (Patient-Rptd) Not at all  Trouble Relaxing: (Patient-Rptd) Not at all  Being so restless that it is hard to sit still: (Patient-Rptd) Not at all  Feeling afraid as if something awful might happen: (Patient-Rptd) Not at all  Becoming easily annoyed or irritable: (Patient-Rptd) Not at all  ROSIE 7 Total Score: (Patient-Rptd) 0  If you checked any problems, how difficult have these problems made it for you to do your work, take care of things at home, or get along with other people: (Patient-Rptd) Not difficult at all    Review of systems is negative except as noted in HPI.  Labs:  WBC   Date Value Ref Range Status   10/12/2021 2.65 (L) 3.40 - 10.80 10*3/mm3 Final     Platelets   Date Value Ref Range Status   10/12/2021 267 140 - 450 10*3/mm3 Final     Hemoglobin   Date Value Ref Range Status   10/12/2021 11.0 (L) 12.0 - 15.9 g/dL Final     Hematocrit   Date Value Ref Range Status   10/12/2021 34.4 34.0 - 46.6 % Final     Glucose   Date Value Ref Range Status   10/12/2021 77 65 - 99 mg/dL Final     Creatinine   Date Value Ref Range Status   10/12/2021 0.61 0.57 - 1.00 mg/dL Final     ALT (SGPT)   Date Value Ref Range Status   10/12/2021 11 1 - 33 U/L Final     AST (SGOT)   Date Value Ref Range Status   10/12/2021 16 1 - 32 U/L Final     BUN   Date Value Ref Range Status   10/12/2021 9 6 - 20 mg/dL Final     Total Cholesterol   Date Value Ref Range Status   10/12/2021 137 0 - 200 mg/dL Final     Triglycerides   Date Value Ref Range Status   10/12/2021 54 0 - 150 mg/dL Final     HDL Cholesterol   Date Value Ref Range Status   10/12/2021 44 40 - 60 mg/dL Final     LDL Cholesterol    Date  Value Ref Range Status   10/12/2021 81 0 - 100 mg/dL Final     VLDL Cholesterol   Date Value Ref Range Status   10/12/2021 12 5 - 40 mg/dL Final     LDL/HDL Ratio   Date Value Ref Range Status   10/12/2021 1.87  Final     Hemoglobin A1C   Date Value Ref Range Status   09/13/2022 5.30 4.80 - 5.60 % Final     TSH   Date Value Ref Range Status   10/12/2021 0.768 0.270 - 4.200 uIU/mL Final      Pain Management Panel  More data exists         Latest Ref Rng & Units 7/3/2024 11/7/2023   Pain Management Panel   Amphetamine, Urine Qual Negative - Negative    Barbiturates Screen, Urine Negative Negative  Negative    Benzodiazepine Screen, Urine Negative Negative  Negative    Buprenorphine, Screen, Urine Negative - Negative    Cocaine Screen, Urine Negative Negative  Negative    Fentanyl, Urine Negative Negative  -   Methadone Screen , Urine Negative Negative  Negative    Methamphetamine, Ur Negative - Negative       Imaging Results:  No Images in the past 120 days found..  Current Medications:   Current Outpatient Medications   Medication Sig Dispense Refill    amphetamine-dextroamphetamine (Adderall) 10 MG tablet Take 1 tablet by mouth Daily With Lunch. 30 tablet 0    amphetamine-dextroamphetamine XR (Adderall XR) 15 MG 24 hr capsule Take 1 capsule by mouth Daily 30 capsule 0    Cholecalciferol (Vitamin D3) 1.25 MG (64088 UT) capsule Take 1 capsule by mouth 1 (One) Time Per Week.      medroxyPROGESTERone (DEPO-PROVERA) 150 MG/ML injection Inject 1 mL into the appropriate muscle as directed by prescriber Every 3 (Three) Months.       No current facility-administered medications for this visit.     Problem List:  Patient Active Problem List   Diagnosis    Anxiety and depression    Attention deficit hyperactivity disorder (ADHD), predominantly inattentive type     Allergy:   Allergies   Allergen Reactions    Amoxicillin Anaphylaxis    Penicillin G Anaphylaxis      Discontinued Medications:  There are no discontinued  medications.    PLAN:   Presentation meets DSM-V criteria for:  Diagnoses and all orders for this visit:    1. Attention deficit hyperactivity disorder (ADHD), predominantly hyperactive type (Primary)    2. Moderate episode of recurrent major depressive disorder    3. Generalized anxiety disorder       Continue Adderall XR 15 mg daily  Continue Adderall IR 10 mg daily booster dose at noon  Medication Education:   ADDERALL (AMPHETAMINE) Risks, benefits, side effects discussed with patient including elevated heart rate, elevated blood pressure, irritability, insomnia, sexual dysfunction, appetite suppressing properties, psychosis.  After discussion of these risks and benefits, the patient voiced understanding and agreed to proceed.  Medications: No orders of the defined types were placed in this encounter.     GABY reviewed.   Discussed medication options and treatment plan of prescribed medication as well as the risks, benefits, and side effects.  Patient is agreeable to call the office with any worsening of symptoms or onset of side effects.   Patient is agreeable to call 911 or go to the nearest ER should he/she begin having SI/HI.   Patient acknowledged, is agreeable to continue with current treatment plan, and was educated on the importance of compliance with treatment and follow-up appointments.  Addressed all questions and concerns.     Psychotherapy:    Provided minimal supportive therapy.  Functional status: Moderate symptoms OR moderate difficulty in social occupational or social functioning (51-60)  Prognosis: Good chance of responding well to treatment   Progress: improving      Follow-up: Return in about 3 months (around 10/8/2025).     This document has been electronically signed by VASU Dennison  July 8, 2025 08:20 EDT  Please note that portions of this note were completed with a voice recognition program.  Copied text in this note has been reviewed and is accurate as of 07/08/25

## 2025-07-08 NOTE — PATIENT INSTRUCTIONS
1.  Please return to clinic at your next scheduled visit.  Please contact the clinic ([phone]) at least 24 hours prior in the event you need to cancel.  2.  Do no harm to yourself or others.    3.  Avoid alcohol and drugs.    4.  Take all medications as prescribed.  Please contact the clinic with any concerns. If you are in need of medication refills, please call the clinic at [phone].    5. Should you want to get in touch with your provider, VASU Dennison, please contact the office ([phone]), and staff will be able to page Abi directly.  6. In the event you have personal crisis, contact the following crisis numbers: Suicide Prevention Hotline 1-352.362.8393; [facility] Helpline 1-322-462-XXRK; [facility] Emergency Room [phone]; text HELLO to 052877; or 065.     SPECIFIC RECOMMENDATIONS:     1.      Medications discussed at this encounter:     No orders of the defined types were placed in this encounter.                      2.      Psychotherapy recommendations: We will continue therapy at future visits.     3.     Return to clinic: Return in about 3 months (around 10/8/2025).

## 2025-07-24 ENCOUNTER — TELEPHONE (OUTPATIENT)
Dept: PSYCHIATRY | Facility: CLINIC | Age: 25
End: 2025-07-24
Payer: COMMERCIAL

## 2025-07-24 DIAGNOSIS — F41.1 GENERALIZED ANXIETY DISORDER: Primary | ICD-10-CM

## 2025-07-24 NOTE — TELEPHONE ENCOUNTER
PT(PATIENT) VERIFIED     PT(PATIENT) STATES SHE HAS THOUGHT ABOUT IT, AND WOULD LIKE TO TRY A MEDICATION FOR ANXIETY     PT(PATIENT) STATES SHE WOULD PREFER A PRN MEDICATION     PLEASE ADVISE

## 2025-07-25 RX ORDER — PROPRANOLOL HYDROCHLORIDE 10 MG/1
10 TABLET ORAL 2 TIMES DAILY PRN
Qty: 60 TABLET | Refills: 1 | Status: SHIPPED | OUTPATIENT
Start: 2025-07-25

## 2025-07-25 NOTE — TELEPHONE ENCOUNTER
PT RETURNED CALL.    I RELAYED OF PROVIDERS MESSAGE VERBATIM.    PT EXPRESSED UNDERSTANDING AND GRATITUDE.     NO FURTHER ACTION NEEDED AT THIS TIME.

## 2025-07-25 NOTE — TELEPHONE ENCOUNTER
After reviewing patient's history it does not look like she has taken propranolol to target anxiety.  We can start propranolol 10 mg twice daily as needed for anxiety/panic attacks.    INDERAL (PROPRANOLOL) Side effects including dizziness, sedation, falls, low blood pressure, low heart rate, possible exacerbation of asthma.      Medication sent to patient's preferred pharmacy in record.

## 2025-07-30 DIAGNOSIS — F90.1 ATTENTION DEFICIT HYPERACTIVITY DISORDER (ADHD), PREDOMINANTLY HYPERACTIVE TYPE: ICD-10-CM

## 2025-07-30 RX ORDER — DEXTROAMPHETAMINE SACCHARATE, AMPHETAMINE ASPARTATE, DEXTROAMPHETAMINE SULFATE AND AMPHETAMINE SULFATE 2.5; 2.5; 2.5; 2.5 MG/1; MG/1; MG/1; MG/1
10 TABLET ORAL
Qty: 30 TABLET | Refills: 0 | Status: SHIPPED | OUTPATIENT
Start: 2025-08-03 | End: 2025-07-31 | Stop reason: SDUPTHER

## 2025-07-30 RX ORDER — DEXTROAMPHETAMINE SACCHARATE, AMPHETAMINE ASPARTATE MONOHYDRATE, DEXTROAMPHETAMINE SULFATE AND AMPHETAMINE SULFATE 3.75; 3.75; 3.75; 3.75 MG/1; MG/1; MG/1; MG/1
15 CAPSULE, EXTENDED RELEASE ORAL DAILY
Qty: 30 CAPSULE | Refills: 0 | Status: SHIPPED | OUTPATIENT
Start: 2025-08-03 | End: 2025-07-31 | Stop reason: SDUPTHER

## 2025-07-30 NOTE — TELEPHONE ENCOUNTER
REFILL REQUEST:    amphetamine-dextroamphetamine XR (Adderall XR) 15 MG 24 hr capsule (07/01/2025)     amphetamine-dextroamphetamine (Adderall) 10 MG tablet (06/05/2025)     F/UP: 10/07/2025.  LOV: 07/08/2025.    LAST UDS:  [facility]05/02/[facility]     Urine Drug Screen - Urine, Clean Catch (07/03/2024 16:01)     LAST CSA:  CONTROLLED SUBSTANCE AGREEMENT - SCAN - CSA, [facility], 11/07/2023 (11/07/2023)     PT NEEDS NEW CSA. NOTE ADDED TO NEXT APPT.

## 2025-07-31 RX ORDER — DEXTROAMPHETAMINE SACCHARATE, AMPHETAMINE ASPARTATE, DEXTROAMPHETAMINE SULFATE AND AMPHETAMINE SULFATE 2.5; 2.5; 2.5; 2.5 MG/1; MG/1; MG/1; MG/1
10 TABLET ORAL
Qty: 30 TABLET | Refills: 0 | Status: SHIPPED | OUTPATIENT
Start: 2025-08-02 | End: 2026-08-02

## 2025-07-31 RX ORDER — DEXTROAMPHETAMINE SACCHARATE, AMPHETAMINE ASPARTATE MONOHYDRATE, DEXTROAMPHETAMINE SULFATE AND AMPHETAMINE SULFATE 3.75; 3.75; 3.75; 3.75 MG/1; MG/1; MG/1; MG/1
15 CAPSULE, EXTENDED RELEASE ORAL DAILY
Qty: 30 CAPSULE | Refills: 0 | Status: SHIPPED | OUTPATIENT
Start: 2025-08-02

## 2025-07-31 NOTE — TELEPHONE ENCOUNTER
Medication sent to patient's preferred pharmacy in record for earlier date.  We will be pharmacist's discretion on whether or not they will dispense the medication a day early.

## 2025-07-31 NOTE — TELEPHONE ENCOUNTER
PT PHONED IN.    SHE IS ASKING IF HER ADDERALL PRESCRIPTIONS CAN BE CHANGED TO 08/02/2025 AS THE PHARMACY IS CLOSED ON SUNDAY. SHE IS ALSO TRYING TO ALIGN HER RXS TO BE ABLE TO BE FILLED AT THE SAME TIME.    PT REQUEST A FOLLOW UP CALL.

## 2025-08-29 DIAGNOSIS — F90.1 ATTENTION DEFICIT HYPERACTIVITY DISORDER (ADHD), PREDOMINANTLY HYPERACTIVE TYPE: ICD-10-CM

## 2025-08-29 RX ORDER — DEXTROAMPHETAMINE SACCHARATE, AMPHETAMINE ASPARTATE MONOHYDRATE, DEXTROAMPHETAMINE SULFATE AND AMPHETAMINE SULFATE 3.75; 3.75; 3.75; 3.75 MG/1; MG/1; MG/1; MG/1
15 CAPSULE, EXTENDED RELEASE ORAL DAILY
Qty: 30 CAPSULE | Refills: 0 | Status: SHIPPED | OUTPATIENT
Start: 2025-08-31

## 2025-08-29 RX ORDER — DEXTROAMPHETAMINE SACCHARATE, AMPHETAMINE ASPARTATE, DEXTROAMPHETAMINE SULFATE AND AMPHETAMINE SULFATE 2.5; 2.5; 2.5; 2.5 MG/1; MG/1; MG/1; MG/1
10 TABLET ORAL
Qty: 30 TABLET | Refills: 0 | Status: SHIPPED | OUTPATIENT
Start: 2025-08-31 | End: 2026-08-31